# Patient Record
Sex: FEMALE | Race: WHITE | Employment: UNEMPLOYED | URBAN - NONMETROPOLITAN AREA
[De-identification: names, ages, dates, MRNs, and addresses within clinical notes are randomized per-mention and may not be internally consistent; named-entity substitution may affect disease eponyms.]

---

## 2020-12-19 ENCOUNTER — APPOINTMENT (OUTPATIENT)
Dept: GENERAL RADIOLOGY | Age: 55
End: 2020-12-19
Payer: COMMERCIAL

## 2020-12-19 ENCOUNTER — HOSPITAL ENCOUNTER (EMERGENCY)
Age: 55
Discharge: HOME | End: 2020-12-19
Attending: EMERGENCY MEDICINE
Payer: COMMERCIAL

## 2020-12-19 VITALS
DIASTOLIC BLOOD PRESSURE: 136 MMHG | WEIGHT: 165 LBS | OXYGEN SATURATION: 88 % | RESPIRATION RATE: 21 BRPM | BODY MASS INDEX: 28.17 KG/M2 | SYSTOLIC BLOOD PRESSURE: 166 MMHG | TEMPERATURE: 96.9 F | HEART RATE: 103 BPM | HEIGHT: 64 IN

## 2020-12-19 PROBLEM — R07.89 NON-CARDIAC CHEST PAIN: Status: ACTIVE | Noted: 2020-12-19

## 2020-12-19 LAB
ABSOLUTE EOS #: 0.12 K/UL (ref 0–0.44)
ABSOLUTE IMMATURE GRANULOCYTE: 0.03 K/UL (ref 0–0.3)
ABSOLUTE LYMPH #: 1.83 K/UL (ref 1.1–3.7)
ABSOLUTE MONO #: 0.46 K/UL (ref 0.1–1.2)
ANION GAP SERPL CALCULATED.3IONS-SCNC: 13 MMOL/L (ref 9–17)
BASOPHILS # BLD: 0 % (ref 0–2)
BASOPHILS ABSOLUTE: <0.03 K/UL (ref 0–0.2)
BNP INTERPRETATION: NORMAL
BUN BLDV-MCNC: 9 MG/DL (ref 6–20)
BUN/CREAT BLD: 15 (ref 9–20)
CALCIUM SERPL-MCNC: 9.9 MG/DL (ref 8.6–10.4)
CHLORIDE BLD-SCNC: 105 MMOL/L (ref 98–107)
CO2: 24 MMOL/L (ref 20–31)
CREAT SERPL-MCNC: 0.59 MG/DL (ref 0.5–0.9)
D-DIMER QUANTITATIVE: <0.27 MG/L FEU (ref 0–0.59)
DIFFERENTIAL TYPE: ABNORMAL
EOSINOPHILS RELATIVE PERCENT: 1 % (ref 1–4)
GFR AFRICAN AMERICAN: >60 ML/MIN
GFR NON-AFRICAN AMERICAN: >60 ML/MIN
GFR SERPL CREATININE-BSD FRML MDRD: ABNORMAL ML/MIN/{1.73_M2}
GFR SERPL CREATININE-BSD FRML MDRD: ABNORMAL ML/MIN/{1.73_M2}
GLUCOSE BLD-MCNC: 118 MG/DL (ref 70–99)
HCT VFR BLD CALC: 44 % (ref 36.3–47.1)
HEMOGLOBIN: 14.4 G/DL (ref 11.9–15.1)
IMMATURE GRANULOCYTES: 0 %
LYMPHOCYTES # BLD: 22 % (ref 24–43)
MCH RBC QN AUTO: 31 PG (ref 25.2–33.5)
MCHC RBC AUTO-ENTMCNC: 32.7 G/DL (ref 28.4–34.8)
MCV RBC AUTO: 94.6 FL (ref 82.6–102.9)
MONOCYTES # BLD: 6 % (ref 3–12)
NRBC AUTOMATED: 0 PER 100 WBC
PDW BLD-RTO: 13.5 % (ref 11.8–14.4)
PLATELET # BLD: 279 K/UL (ref 138–453)
PLATELET ESTIMATE: ABNORMAL
PMV BLD AUTO: 11 FL (ref 8.1–13.5)
POTASSIUM SERPL-SCNC: 3.7 MMOL/L (ref 3.7–5.3)
PRO-BNP: 115 PG/ML
RBC # BLD: 4.65 M/UL (ref 3.95–5.11)
RBC # BLD: ABNORMAL 10*6/UL
SARS-COV-2, RAPID: NOT DETECTED
SARS-COV-2: NORMAL
SARS-COV-2: NORMAL
SEG NEUTROPHILS: 71 % (ref 36–65)
SEGMENTED NEUTROPHILS ABSOLUTE COUNT: 5.85 K/UL (ref 1.5–8.1)
SODIUM BLD-SCNC: 142 MMOL/L (ref 135–144)
SOURCE: NORMAL
TROPONIN INTERP: NORMAL
TROPONIN INTERP: NORMAL
TROPONIN T: NORMAL NG/ML
TROPONIN T: NORMAL NG/ML
TROPONIN, HIGH SENSITIVITY: <6 NG/L (ref 0–14)
TROPONIN, HIGH SENSITIVITY: <6 NG/L (ref 0–14)
WBC # BLD: 8.3 K/UL (ref 3.5–11.3)
WBC # BLD: ABNORMAL 10*3/UL

## 2020-12-19 PROCEDURE — 80048 BASIC METABOLIC PNL TOTAL CA: CPT

## 2020-12-19 PROCEDURE — U0002 COVID-19 LAB TEST NON-CDC: HCPCS

## 2020-12-19 PROCEDURE — 2580000003 HC RX 258: Performed by: EMERGENCY MEDICINE

## 2020-12-19 PROCEDURE — 71045 X-RAY EXAM CHEST 1 VIEW: CPT

## 2020-12-19 PROCEDURE — 83880 ASSAY OF NATRIURETIC PEPTIDE: CPT

## 2020-12-19 PROCEDURE — 6360000002 HC RX W HCPCS: Performed by: EMERGENCY MEDICINE

## 2020-12-19 PROCEDURE — 6370000000 HC RX 637 (ALT 250 FOR IP): Performed by: EMERGENCY MEDICINE

## 2020-12-19 PROCEDURE — U0003 INFECTIOUS AGENT DETECTION BY NUCLEIC ACID (DNA OR RNA); SEVERE ACUTE RESPIRATORY SYNDROME CORONAVIRUS 2 (SARS-COV-2) (CORONAVIRUS DISEASE [COVID-19]), AMPLIFIED PROBE TECHNIQUE, MAKING USE OF HIGH THROUGHPUT TECHNOLOGIES AS DESCRIBED BY CMS-2020-01-R: HCPCS

## 2020-12-19 PROCEDURE — 36415 COLL VENOUS BLD VENIPUNCTURE: CPT

## 2020-12-19 PROCEDURE — 93005 ELECTROCARDIOGRAM TRACING: CPT | Performed by: EMERGENCY MEDICINE

## 2020-12-19 PROCEDURE — 85025 COMPLETE CBC W/AUTO DIFF WBC: CPT

## 2020-12-19 PROCEDURE — 99285 EMERGENCY DEPT VISIT HI MDM: CPT

## 2020-12-19 PROCEDURE — 96374 THER/PROPH/DIAG INJ IV PUSH: CPT

## 2020-12-19 PROCEDURE — 96375 TX/PRO/DX INJ NEW DRUG ADDON: CPT

## 2020-12-19 PROCEDURE — 85379 FIBRIN DEGRADATION QUANT: CPT

## 2020-12-19 PROCEDURE — 84484 ASSAY OF TROPONIN QUANT: CPT

## 2020-12-19 RX ORDER — ASPIRIN 81 MG/1
324 TABLET, CHEWABLE ORAL ONCE
Status: COMPLETED | OUTPATIENT
Start: 2020-12-19 | End: 2020-12-19

## 2020-12-19 RX ORDER — NITROGLYCERIN 0.4 MG/1
0.4 TABLET SUBLINGUAL ONCE
Status: COMPLETED | OUTPATIENT
Start: 2020-12-19 | End: 2020-12-19

## 2020-12-19 RX ORDER — ZALEPLON 5 MG/1
5 CAPSULE ORAL
COMMUNITY

## 2020-12-19 RX ORDER — SODIUM CHLORIDE 9 MG/ML
150 INJECTION, SOLUTION INTRAVENOUS CONTINUOUS
Status: DISCONTINUED | OUTPATIENT
Start: 2020-12-19 | End: 2020-12-19 | Stop reason: HOSPADM

## 2020-12-19 RX ORDER — ONDANSETRON 2 MG/ML
4 INJECTION INTRAMUSCULAR; INTRAVENOUS ONCE
Status: COMPLETED | OUTPATIENT
Start: 2020-12-19 | End: 2020-12-19

## 2020-12-19 RX ORDER — FENTANYL CITRATE 50 UG/ML
50 INJECTION, SOLUTION INTRAMUSCULAR; INTRAVENOUS ONCE
Status: COMPLETED | OUTPATIENT
Start: 2020-12-19 | End: 2020-12-19

## 2020-12-19 RX ADMIN — ASPIRIN 81 MG CHEWABLE TABLET 324 MG: 81 TABLET CHEWABLE at 12:00

## 2020-12-19 RX ADMIN — NITROGLYCERIN 0.4 MG: 0.4 TABLET SUBLINGUAL at 12:04

## 2020-12-19 RX ADMIN — TICAGRELOR 180 MG: 90 TABLET ORAL at 12:00

## 2020-12-19 RX ADMIN — NITROGLYCERIN 0.4 MG: 0.4 TABLET SUBLINGUAL at 12:11

## 2020-12-19 RX ADMIN — FENTANYL CITRATE 50 MCG: 50 INJECTION, SOLUTION INTRAMUSCULAR; INTRAVENOUS at 12:17

## 2020-12-19 RX ADMIN — ONDANSETRON 4 MG: 2 INJECTION INTRAMUSCULAR; INTRAVENOUS at 12:00

## 2020-12-19 RX ADMIN — SODIUM CHLORIDE 150 ML/HR: 9 INJECTION, SOLUTION INTRAVENOUS at 12:00

## 2020-12-19 ASSESSMENT — PAIN DESCRIPTION - LOCATION
LOCATION: CHEST
LOCATION: CHEST

## 2020-12-19 ASSESSMENT — PAIN DESCRIPTION - PAIN TYPE
TYPE: ACUTE PAIN
TYPE: ACUTE PAIN

## 2020-12-19 ASSESSMENT — PAIN DESCRIPTION - ORIENTATION: ORIENTATION: MID

## 2020-12-19 ASSESSMENT — ENCOUNTER SYMPTOMS
VOMITING: 0
NAUSEA: 0
SHORTNESS OF BREATH: 1
DIARRHEA: 0
ABDOMINAL DISTENTION: 0
COLOR CHANGE: 0
COUGH: 0
TROUBLE SWALLOWING: 0
BACK PAIN: 0
CHOKING: 0
WHEEZING: 0
ABDOMINAL PAIN: 0
CONSTIPATION: 0

## 2020-12-19 ASSESSMENT — PAIN SCALES - GENERAL
PAINLEVEL_OUTOF10: 5
PAINLEVEL_OUTOF10: 2

## 2020-12-19 ASSESSMENT — PAIN DESCRIPTION - DESCRIPTORS: DESCRIPTORS: PRESSURE

## 2020-12-19 NOTE — ED PROVIDER NOTES
[facility] ED  EMERGENCY DEPARTMENT ENCOUNTER      Pt Swati Rizo  MRN: [id number]  Birthdate 1965  Date of evaluation: 12/19/2020  Provider: Hosea Carey MD    [address] [place] [address]     Chief Complaint   Patient presents with    Chest Pain     Midsternal--states it feel like an elephant on her chest, intermittent onset yesterday--improved after throwing up    Shortness of Breath     onset yesterday with the chest pain         HISTORY OF PRESENT ILLNESS   (Location/Symptom, Timing/Onset, Context/Setting, Quality, Duration, Modifying Factors, Severity)  Note limiting factors. HPI the patient is a 39-year-old female who presents with a history of chest pain and nausea since yesterday. Earlier today she rated her chest pain at a level 10. After vomiting it came down to a level 5. She also got short of breath. The chest pain is a tightness and squeezing. It does not radiate into her neck or into her arms. Patient has no history of coronary artery disease. No hypertension, hyperlipidemia or diabetes. There is a family history of coronary artery disease and strokes. Nursing Notes were reviewed. REVIEW OF SYSTEMS    (2-9 systems for level 4, 10 or more for level 5)     Review of Systems   Constitutional: Positive for activity change and appetite change. Negative for fatigue and fever. HENT: Negative for congestion and trouble swallowing. Eyes: Negative for visual disturbance. Respiratory: Positive for shortness of breath. Negative for cough, choking and wheezing. Cardiovascular: Positive for chest pain. Negative for palpitations and leg swelling. Gastrointestinal: Negative for abdominal distention, abdominal pain, constipation, diarrhea, nausea and vomiting. Genitourinary: Negative for difficulty urinating and flank pain. Musculoskeletal: Negative for arthralgias, back pain, gait problem, neck pain and neck stiffness.    Skin: Negative for color change and pallor. Neurological: Negative for dizziness, light-headedness and headaches. Psychiatric/Behavioral: Negative for confusion, decreased concentration and dysphoric mood. MEDICAL HISTORY     Past Medical History:   Diagnosis Date    Allergic rhinitis     Anxiety     Arthritis     GERD (gastroesophageal reflux disease)     Impaired fasting glucose     Ligament tear     Rt shoulder [facility] injections    Pure hyperglyceridemia          SURGICAL HISTORY       Past Surgical History:   Procedure Laterality Date    APPENDECTOMY  1995    BUNIONECTOMY  9/2011, 11/2011    CARPAL TUNNEL RELEASE  1993   [address]    breech    COLONOSCOPY  12/15/2015    Dr. Santana Montana    left wrist         CURRENT MEDICATIONS       Current Discharge Medication List      CONTINUE these medications which have NOT CHANGED    Details   zaleplon (SONATA) 5 MG capsule Take 5 mg by mouth. amphetamine-dextroamphetamine (ADDERALL) 20 MG tablet Take 15 mg by mouth 2 times daily. gabapentin (NEURONTIN) 300 MG capsule Take 1 capsule by mouth 3 times daily. Qty: 90 capsule, Refills: 0      QUEtiapine (SEROQUEL) 25 MG tablet Take 325 mg by mouth nightly       loratadine (CLARITIN) 10 MG tablet Take 10 mg by mouth daily      diclofenac sodium (VOLTAREN) 1 % GEL Apply 4 g topically 4 times daily as needed for up to 30 days. Qty: 5 Tube, Refills: 0    Associated Diagnoses: Left shoulder pain; Bilateral shoulder pain; Collar bone pain, left; Chronic pain; Other chronic pain;  Other acute pain             ALLERGIES     Pcn [penicillins], Norco [hydrocodone-acetaminophen], and Sulfa antibiotics    FAMILY HISTORY       Family History   Problem Relation Age of Onset    Heart Disease Mother     High Cholesterol Mother     Stroke Mother     Breast Cancer Maternal Grandmother     High Cholesterol Father     High Blood Pressure Sister           SOCIAL HISTORY       Social History Socioeconomic History    Marital status:      Spouse name: None    Number of children: None    Years of education: None    Highest education level: None   Occupational History    None   Social Needs    Financial resource strain: None    Food insecurity     Worry: None     Inability: None    Transportation needs     Medical: None     Non-medical: None   Tobacco Use    Smoking status: Current Every Day Smoker     Packs/day: 0.25     Years: 30.00     Pack years: 7.50     Types: Cigarettes   Substance and Sexual Activity    Alcohol use: Yes     Comment: occas    Drug use: No    Sexual activity: Not Currently   Lifestyle    Physical activity     Days per week: None     Minutes per session: None    Stress: None   Relationships    Social connections     Talks on phone: None     Gets together: None     Attends Holiness service: None     Active member of club or organization: None     Attends meetings of clubs or organizations: None     Relationship status: None    Intimate partner violence     Fear of current or ex partner: None     Emotionally abused: None     Physically abused: None     Forced sexual activity: None   Other Topics Concern    None   Social History Narrative    None       SCREENINGS    [place] Coma Scale  Eye Opening: Spontaneous  Best Verbal Response: Oriented  Best Motor Response: Obeys commands  [place] Coma Scale Score: 15        PHYSICAL EXAM  (up to 7 for level 4, 8 or more for level 5)     ED Triage Vitals [12/19/20 1111]   BP Temp Temp Source Pulse Resp SpO2 Height Weight   (!) 122/52 96.9 °F (36.1 °C) Tympanic 90 18 98 % 5' 3.5\" (1.613 m) 165 lb (74.8 kg)       Physical Exam  Constitutional:       General: She is not in acute distress. Appearance: She is well-developed. She is obese. HENT:      Head: Normocephalic and atraumatic. Eyes:      Extraocular Movements: Extraocular movements intact. Pupils: Pupils are equal, round, and reactive to light.    Neck: EMERGENCY DEPARTMENT COURSE and DIFFERENTIAL DIAGNOSIS/MDM:   Vitals:    Vitals:    12/19/20 1209 12/19/20 1215 12/19/20 1216 12/19/20 1313   BP: 113/65 (!) 118/41 (!) 118/41 (!) 65/47   Pulse: 101 99  90   Resp: (!) 42 16  13   Temp:       TempSrc:       SpO2: 97% 90%  (!) 75%   Weight:       Height:               MDM the patient did not want to be admitted. Since both of her troponins were negative and the EKG was negative I feel it is reasonable to allow her to go home. I have strongly encouraged her to return to the emergency department if she starts having discomfort again. CONSULTS:  None    PROCEDURES:  Unless otherwise noted below, none     Procedures    FINAL IMPRESSION      1.  Non-cardiac chest pain          DISPOSITION/PLAN   DISPOSITION Decision To Discharge 12/19/2020 01:49:19 PM      PATIENT REFERRED TO:  Denise Mcdowell MD  [address] [place] [address] Ney Dang  [facility] [address] [phone]    Schedule an appointment as soon as possible for a visit         DISCHARGE MEDICATIONS:  Current Discharge Medication List                 (Please note that portions ofthis note were completed with a voice recognition program.  Efforts were made to edit the dictations but occasionally words are mis-transcribed.)      Dulce Bender MD (electronically signed)  Attending Emergency Physician          Munir Allen MD  12/19/20 2938

## 2020-12-20 LAB
EKG ATRIAL RATE: 94 BPM
EKG P AXIS: 79 DEGREES
EKG P-R INTERVAL: 126 MS
EKG Q-T INTERVAL: 382 MS
EKG QRS DURATION: 102 MS
EKG QTC CALCULATION (BAZETT): 477 MS
EKG R AXIS: 79 DEGREES
EKG T AXIS: 56 DEGREES
EKG VENTRICULAR RATE: 94 BPM

## 2020-12-20 PROCEDURE — 93010 ELECTROCARDIOGRAM REPORT: CPT | Performed by: INTERNAL MEDICINE

## 2020-12-21 NOTE — PROGRESS NOTES
Cardiology follow up requested by the emergency room team. Please call the patient to schedule appointment.  Thank you

## 2022-07-11 ENCOUNTER — HOSPITAL ENCOUNTER (OUTPATIENT)
Age: 57
Discharge: HOME | End: 2022-07-11
Payer: COMMERCIAL

## 2022-07-11 LAB
ANION GAP SERPL CALCULATED.3IONS-SCNC: 11 MMOL/L (ref 9–17)
BUN BLDV-MCNC: 9 MG/DL (ref 6–20)
BUN/CREAT BLD: 13 (ref 9–20)
C-REACTIVE PROTEIN: 4.1 MG/L (ref 0–5)
CALCIUM SERPL-MCNC: 10.1 MG/DL (ref 8.6–10.4)
CHLORIDE BLD-SCNC: 103 MMOL/L (ref 98–107)
CO2: 28 MMOL/L (ref 20–31)
CREAT SERPL-MCNC: 0.67 MG/DL (ref 0.5–0.9)
GFR AFRICAN AMERICAN: >60 ML/MIN
GFR NON-AFRICAN AMERICAN: >60 ML/MIN
GFR SERPL CREATININE-BSD FRML MDRD: NORMAL ML/MIN/{1.73_M2}
GFR SERPL CREATININE-BSD FRML MDRD: NORMAL ML/MIN/{1.73_M2}
GLUCOSE BLD-MCNC: 74 MG/DL (ref 70–99)
HCT VFR BLD CALC: 43.9 % (ref 36.3–47.1)
HEMOGLOBIN: 13.7 G/DL (ref 11.9–15.1)
MCH RBC QN AUTO: 30.2 PG (ref 25.2–33.5)
MCHC RBC AUTO-ENTMCNC: 31.2 G/DL (ref 28.4–34.8)
MCV RBC AUTO: 96.9 FL (ref 82.6–102.9)
NRBC AUTOMATED: 0 PER 100 WBC
PDW BLD-RTO: 14.1 % (ref 11.8–14.4)
PLATELET # BLD: 281 K/UL (ref 138–453)
PMV BLD AUTO: 12.7 FL (ref 8.1–13.5)
POTASSIUM SERPL-SCNC: 3.7 MMOL/L (ref 3.7–5.3)
RBC # BLD: 4.53 M/UL (ref 3.95–5.11)
SEDIMENTATION RATE, ERYTHROCYTE: 3 MM/HR (ref 0–30)
SODIUM BLD-SCNC: 142 MMOL/L (ref 135–144)
WBC # BLD: 6.5 K/UL (ref 3.5–11.3)

## 2022-07-11 PROCEDURE — 86140 C-REACTIVE PROTEIN: CPT

## 2022-07-11 PROCEDURE — 36415 COLL VENOUS BLD VENIPUNCTURE: CPT

## 2022-07-11 PROCEDURE — 80048 BASIC METABOLIC PNL TOTAL CA: CPT

## 2022-07-11 PROCEDURE — 93005 ELECTROCARDIOGRAM TRACING: CPT | Performed by: PODIATRIST

## 2022-07-11 PROCEDURE — 85027 COMPLETE CBC AUTOMATED: CPT

## 2022-07-11 PROCEDURE — 85652 RBC SED RATE AUTOMATED: CPT

## 2022-07-12 LAB
EKG ATRIAL RATE: 72 BPM
EKG P AXIS: 68 DEGREES
EKG P-R INTERVAL: 128 MS
EKG Q-T INTERVAL: 404 MS
EKG QRS DURATION: 96 MS
EKG QTC CALCULATION (BAZETT): 469 MS
EKG R AXIS: 67 DEGREES
EKG T AXIS: 46 DEGREES
EKG VENTRICULAR RATE: 81 BPM

## 2022-07-12 PROCEDURE — 93010 ELECTROCARDIOGRAM REPORT: CPT | Performed by: FAMILY MEDICINE

## 2022-07-25 NOTE — PROGRESS NOTES
Patient instructed on the pre-operative, intra-operative, and post-operative process. Patient's surgical procedure and day of surgery confirmed. Patient instructed on NPO status. Medication instructions reviewed with patient, patient instructed to take meds with small sip of water but patient states she will probably hold off and take them after surgery. CHG pre-operative wash instructions reviewed with patient. Pre operative instruction sheet reviewed with patient per PAT phone interview. Patient voiced understanding and denies any questions at this time.

## 2022-07-27 ENCOUNTER — ANESTHESIA EVENT (OUTPATIENT)
Dept: OPERATING ROOM | Age: 57
End: 2022-07-27
Payer: COMMERCIAL

## 2022-07-28 ENCOUNTER — ANESTHESIA (OUTPATIENT)
Dept: OPERATING ROOM | Age: 57
End: 2022-07-28
Payer: COMMERCIAL

## 2022-07-28 ENCOUNTER — APPOINTMENT (OUTPATIENT)
Dept: GENERAL RADIOLOGY | Age: 57
End: 2022-07-28
Attending: PODIATRIST
Payer: COMMERCIAL

## 2022-07-28 ENCOUNTER — HOSPITAL ENCOUNTER (OUTPATIENT)
Age: 57
Setting detail: OUTPATIENT SURGERY
Discharge: HOME | End: 2022-07-28
Attending: PODIATRIST | Admitting: PODIATRIST
Payer: COMMERCIAL

## 2022-07-28 VITALS
HEIGHT: 64 IN | SYSTOLIC BLOOD PRESSURE: 115 MMHG | DIASTOLIC BLOOD PRESSURE: 86 MMHG | OXYGEN SATURATION: 97 % | BODY MASS INDEX: 26.46 KG/M2 | RESPIRATION RATE: 16 BRPM | TEMPERATURE: 97.2 F | HEART RATE: 74 BPM | WEIGHT: 155 LBS

## 2022-07-28 PROCEDURE — 2580000003 HC RX 258: Performed by: NURSE ANESTHETIST, CERTIFIED REGISTERED

## 2022-07-28 PROCEDURE — 2720000010 HC SURG SUPPLY STERILE: Performed by: PODIATRIST

## 2022-07-28 PROCEDURE — 6370000000 HC RX 637 (ALT 250 FOR IP): Performed by: NURSE ANESTHETIST, CERTIFIED REGISTERED

## 2022-07-28 PROCEDURE — C1713 ANCHOR/SCREW BN/BN,TIS/BN: HCPCS | Performed by: PODIATRIST

## 2022-07-28 PROCEDURE — C1776 JOINT DEVICE (IMPLANTABLE): HCPCS | Performed by: PODIATRIST

## 2022-07-28 PROCEDURE — 2500000003 HC RX 250 WO HCPCS: Performed by: NURSE ANESTHETIST, CERTIFIED REGISTERED

## 2022-07-28 PROCEDURE — 3600000003 HC SURGERY LEVEL 3 BASE: Performed by: PODIATRIST

## 2022-07-28 PROCEDURE — 6360000002 HC RX W HCPCS: Performed by: NURSE ANESTHETIST, CERTIFIED REGISTERED

## 2022-07-28 PROCEDURE — 2500000003 HC RX 250 WO HCPCS: Performed by: PODIATRIST

## 2022-07-28 PROCEDURE — 3600000013 HC SURGERY LEVEL 3 ADDTL 15MIN: Performed by: PODIATRIST

## 2022-07-28 PROCEDURE — 7100000011 HC PHASE II RECOVERY - ADDTL 15 MIN: Performed by: PODIATRIST

## 2022-07-28 PROCEDURE — 3700000001 HC ADD 15 MINUTES (ANESTHESIA): Performed by: PODIATRIST

## 2022-07-28 PROCEDURE — 3209999900 FLUORO FOR SURGICAL PROCEDURES

## 2022-07-28 PROCEDURE — 3700000000 HC ANESTHESIA ATTENDED CARE: Performed by: PODIATRIST

## 2022-07-28 PROCEDURE — 2709999900 HC NON-CHARGEABLE SUPPLY: Performed by: PODIATRIST

## 2022-07-28 PROCEDURE — 7100000010 HC PHASE II RECOVERY - FIRST 15 MIN: Performed by: PODIATRIST

## 2022-07-28 DEVICE — SYSTEM IMPL FOREFOOT PEEK W/ 3X8MM TENODESIS SCR OBLONG: Type: IMPLANTABLE DEVICE | Site: FOOT | Status: FUNCTIONAL

## 2022-07-28 DEVICE — GRAFT BONE SUBSTITUTE FIBERGRAFT 1CC: Type: IMPLANTABLE DEVICE | Site: FOOT | Status: FUNCTIONAL

## 2022-07-28 DEVICE — SCREW BNE L18MM DIA2.7MM ANK S STL ST VAR ANG LOK FULL THRD: Type: IMPLANTABLE DEVICE | Site: FOOT | Status: FUNCTIONAL

## 2022-07-28 DEVICE — SCREW BNE L14MM DIA2.7MM ANK S STL ST VAR ANG LOK FULL THRD: Type: IMPLANTABLE DEVICE | Site: FOOT | Status: FUNCTIONAL

## 2022-07-28 DEVICE — SCREW BNE L16MM DIA2.7MM ANK S STL ST VAR ANG LOK FULL THRD: Type: IMPLANTABLE DEVICE | Site: FOOT | Status: FUNCTIONAL

## 2022-07-28 DEVICE — IMPLANTABLE DEVICE: Type: IMPLANTABLE DEVICE | Site: FOOT | Status: FUNCTIONAL

## 2022-07-28 RX ORDER — DIMENHYDRINATE 50 MG/1
50 TABLET ORAL ONCE
Status: COMPLETED | OUTPATIENT
Start: 2022-07-28 | End: 2022-07-28

## 2022-07-28 RX ORDER — FENTANYL CITRATE 50 UG/ML
INJECTION, SOLUTION INTRAMUSCULAR; INTRAVENOUS PRN
Status: DISCONTINUED | OUTPATIENT
Start: 2022-07-28 | End: 2022-07-28 | Stop reason: SDUPTHER

## 2022-07-28 RX ORDER — DEXAMETHASONE SODIUM PHOSPHATE 4 MG/ML
INJECTION, SOLUTION INTRA-ARTICULAR; INTRALESIONAL; INTRAMUSCULAR; INTRAVENOUS; SOFT TISSUE PRN
Status: DISCONTINUED | OUTPATIENT
Start: 2022-07-28 | End: 2022-07-28 | Stop reason: SDUPTHER

## 2022-07-28 RX ORDER — PHENYLEPHRINE HYDROCHLORIDE 10 MG/ML
INJECTION INTRAVENOUS PRN
Status: DISCONTINUED | OUTPATIENT
Start: 2022-07-28 | End: 2022-07-28 | Stop reason: SDUPTHER

## 2022-07-28 RX ORDER — SODIUM CHLORIDE 0.9 % (FLUSH) 0.9 %
5-40 SYRINGE (ML) INJECTION EVERY 12 HOURS SCHEDULED
Status: DISCONTINUED | OUTPATIENT
Start: 2022-07-28 | End: 2022-07-28 | Stop reason: HOSPADM

## 2022-07-28 RX ORDER — BUPIVACAINE HYDROCHLORIDE 5 MG/ML
INJECTION, SOLUTION EPIDURAL; INTRACAUDAL PRN
Status: DISCONTINUED | OUTPATIENT
Start: 2022-07-28 | End: 2022-07-28 | Stop reason: ALTCHOICE

## 2022-07-28 RX ORDER — PROPOFOL 10 MG/ML
INJECTION, EMULSION INTRAVENOUS PRN
Status: DISCONTINUED | OUTPATIENT
Start: 2022-07-28 | End: 2022-07-28 | Stop reason: SDUPTHER

## 2022-07-28 RX ORDER — SODIUM CHLORIDE, SODIUM LACTATE, POTASSIUM CHLORIDE, CALCIUM CHLORIDE 600; 310; 30; 20 MG/100ML; MG/100ML; MG/100ML; MG/100ML
INJECTION, SOLUTION INTRAVENOUS CONTINUOUS
Status: DISCONTINUED | OUTPATIENT
Start: 2022-07-28 | End: 2022-07-28 | Stop reason: HOSPADM

## 2022-07-28 RX ORDER — CLINDAMYCIN PHOSPHATE 900 MG/50ML
900 INJECTION INTRAVENOUS ONCE
Status: COMPLETED | OUTPATIENT
Start: 2022-07-28 | End: 2022-07-28

## 2022-07-28 RX ORDER — ACETAMINOPHEN 325 MG/1
650 TABLET ORAL ONCE
Status: COMPLETED | OUTPATIENT
Start: 2022-07-28 | End: 2022-07-28

## 2022-07-28 RX ORDER — SODIUM CHLORIDE 0.9 % (FLUSH) 0.9 %
5-40 SYRINGE (ML) INJECTION PRN
Status: DISCONTINUED | OUTPATIENT
Start: 2022-07-28 | End: 2022-07-28 | Stop reason: HOSPADM

## 2022-07-28 RX ORDER — KETOROLAC TROMETHAMINE 15 MG/ML
30 INJECTION, SOLUTION INTRAMUSCULAR; INTRAVENOUS
Status: COMPLETED | OUTPATIENT
Start: 2022-07-28 | End: 2022-07-28

## 2022-07-28 RX ORDER — ONDANSETRON 2 MG/ML
INJECTION INTRAMUSCULAR; INTRAVENOUS PRN
Status: DISCONTINUED | OUTPATIENT
Start: 2022-07-28 | End: 2022-07-28 | Stop reason: SDUPTHER

## 2022-07-28 RX ORDER — SODIUM CHLORIDE 9 MG/ML
INJECTION, SOLUTION INTRAVENOUS PRN
Status: DISCONTINUED | OUTPATIENT
Start: 2022-07-28 | End: 2022-07-28 | Stop reason: HOSPADM

## 2022-07-28 RX ORDER — ONDANSETRON 2 MG/ML
4 INJECTION INTRAMUSCULAR; INTRAVENOUS
Status: DISCONTINUED | OUTPATIENT
Start: 2022-07-28 | End: 2022-07-28 | Stop reason: HOSPADM

## 2022-07-28 RX ORDER — LIDOCAINE HYDROCHLORIDE 20 MG/ML
INJECTION, SOLUTION EPIDURAL; INFILTRATION; INTRACAUDAL; PERINEURAL PRN
Status: DISCONTINUED | OUTPATIENT
Start: 2022-07-28 | End: 2022-07-28 | Stop reason: SDUPTHER

## 2022-07-28 RX ADMIN — CLINDAMYCIN PHOSPHATE 900 MG: 900 INJECTION, SOLUTION INTRAVENOUS at 10:24

## 2022-07-28 RX ADMIN — LIDOCAINE HYDROCHLORIDE 100 MG: 20 INJECTION, SOLUTION EPIDURAL; INFILTRATION; INTRACAUDAL; PERINEURAL at 10:31

## 2022-07-28 RX ADMIN — KETOROLAC TROMETHAMINE 30 MG: 15 INJECTION, SOLUTION INTRAMUSCULAR; INTRAVENOUS at 12:41

## 2022-07-28 RX ADMIN — ONDANSETRON 4 MG: 2 INJECTION INTRAMUSCULAR; INTRAVENOUS at 10:30

## 2022-07-28 RX ADMIN — PHENYLEPHRINE HYDROCHLORIDE 100 MCG: 10 INJECTION INTRAVENOUS at 10:44

## 2022-07-28 RX ADMIN — DIMENHYDRINATE 50 MG: 50 TABLET ORAL at 08:58

## 2022-07-28 RX ADMIN — PROPOFOL 150 MG: 10 INJECTION, EMULSION INTRAVENOUS at 10:32

## 2022-07-28 RX ADMIN — SODIUM CHLORIDE, POTASSIUM CHLORIDE, SODIUM LACTATE AND CALCIUM CHLORIDE: 600; 310; 30; 20 INJECTION, SOLUTION INTRAVENOUS at 09:11

## 2022-07-28 RX ADMIN — PHENYLEPHRINE HYDROCHLORIDE 100 MCG: 10 INJECTION INTRAVENOUS at 10:40

## 2022-07-28 RX ADMIN — DEXAMETHASONE SODIUM PHOSPHATE 4 MG: 4 INJECTION, SOLUTION INTRAMUSCULAR; INTRAVENOUS at 10:35

## 2022-07-28 RX ADMIN — PHENYLEPHRINE HYDROCHLORIDE 100 MCG: 10 INJECTION INTRAVENOUS at 11:16

## 2022-07-28 RX ADMIN — PHENYLEPHRINE HYDROCHLORIDE 100 MCG: 10 INJECTION INTRAVENOUS at 10:42

## 2022-07-28 RX ADMIN — FENTANYL CITRATE 25 MCG: 50 INJECTION INTRAMUSCULAR; INTRAVENOUS at 10:49

## 2022-07-28 RX ADMIN — PHENYLEPHRINE HYDROCHLORIDE 100 MCG: 10 INJECTION INTRAVENOUS at 11:10

## 2022-07-28 RX ADMIN — SODIUM CHLORIDE, POTASSIUM CHLORIDE, SODIUM LACTATE AND CALCIUM CHLORIDE: 600; 310; 30; 20 INJECTION, SOLUTION INTRAVENOUS at 12:04

## 2022-07-28 RX ADMIN — FENTANYL CITRATE 25 MCG: 50 INJECTION INTRAMUSCULAR; INTRAVENOUS at 12:15

## 2022-07-28 RX ADMIN — ACETAMINOPHEN 650 MG: 325 TABLET ORAL at 08:58

## 2022-07-28 ASSESSMENT — PAIN DESCRIPTION - PAIN TYPE: TYPE: ACUTE PAIN;SURGICAL PAIN

## 2022-07-28 ASSESSMENT — PAIN SCALES - GENERAL
PAINLEVEL_OUTOF10: 6
PAINLEVEL_OUTOF10: 2
PAINLEVEL_OUTOF10: 2
PAINLEVEL_OUTOF10: 0
PAINLEVEL_OUTOF10: 6
PAINLEVEL_OUTOF10: 6
PAINLEVEL_OUTOF10: 0

## 2022-07-28 ASSESSMENT — PAIN DESCRIPTION - DESCRIPTORS: DESCRIPTORS: ACHING

## 2022-07-28 ASSESSMENT — PAIN DESCRIPTION - LOCATION: LOCATION: FOOT

## 2022-07-28 ASSESSMENT — PAIN DESCRIPTION - ORIENTATION: ORIENTATION: LEFT

## 2022-07-28 NOTE — DISCHARGE INSTRUCTIONS
1.  Keep the surgical dressing clean, dry, and intact  2. Elevate the operative extremity above heart level when seated or lying  3. Place ice behind the knee of the operative extremity 20 minutes per hour while awake  4. Must be in cam boot at all times, including while sleeping. Weight bearing in [address]    1. Do not drive or operate hazardous machinery for 24 hours. 2.  Do not make important personal or business decisions for 24 hours. 3.  Do not drink alcoholic beverages for 24 hours. 4.  Do not smoke tobacco products for 24 hours. 5.  Eat light foods (Jell-O, soups, etc....) and drink plenty of fluids (water, Sprite, etc...) up to 8 glasses per day, as you can tolerate. 6.  If your bandages become soaked with bright red blood, place another dressing pad over your bandages. (DO NOT remove original bandage.)  Call your surgeon for further instructions. A small amount of bright red blood is to be expected. 7.  Limit your activities for 24 hours. Do not engage in heavy work until your surgeon gives you permission. 8.  Report the following signs or any questions regarding your physical condition to your surgeon immediately:    Excessive swelling of, or around the wound area. Redness. Temperature of 100 degrees (F) or above. Excessive pain. 9.  Call your surgeon for any questions regarding your surgery. 10. see your surgeon in _8/4 @ 1:00__________.      **may take motrin/ibuprofen after 7:00 pm if needed**

## 2022-07-28 NOTE — OP NOTE
Operative Note      Patient: Bhavna Archer  YOB: 1965  MRN: [id number]    Date of Procedure: 7/28/2022    Pre-Op Diagnosis: METATARSALGIS LEFT FOOT    Post-Op Diagnosis: Same       Procedure(s):  FOOT ARTHRODESIS-1ST MPJ  FOOT OSTEOTOMY-WEIL OSTEOTOMY, 2ND METATARSAL, PLANTAR PLATE REPAIR 2ND MPJ    Surgeon(s):  Lashell Shaver DPM    Assistant:   * No surgical staff found *    Anesthesia: General    Estimated Blood Loss (mL): Minimal    Complications: None    Specimens:   None  Implants:  Implant Name Type Inv. Item Serial No.  Lot No. LRB No. Used Action   GRAFT BONE SUBSTITUTE FIBERGRAFT 1CC - [id number]  GRAFT BONE SUBSTITUTE FIBERGRAFT 1CC  [facility] [id number] Left 1 Implanted   PLATE BNE M [id number] 0DEG 6 H NONSTERILE L 1ST MTP FUS S STL - [id number]  PLATE BNE M [id number] 0DEG 6 H NONSTERILE L 1ST MTP FUS S STL  DEPU[employer]  Left 1 Implanted   SCREW BNE L14MM DIA2.7MM ANK S STL ST ANDREIA ANG ALICIA FULL THRD - [id number]  SCREW BNE L14MM DIA2.7MM ANK S STL ST ANDREIA ANG ALICIA FULL THRD  DEPUY [employer]  Left 1 Implanted   SCREW BNE L16MM DIA2.7MM ANK S STL ST ANDREIA ANG ALICIA FULL THRD - [id number]  SCREW BNE L16MM DIA2.7MM ANK S STL ST ANDREIA ANG ALICIA FULL THRD  DEPU[employer]  Left 3 Implanted   SCREW BNE L18MM DIA2.7MM ANK S STL ST ANDREIA ANG ALICIA FULL THRD - [id number]  SCREW BNE L18MM DIA2.7MM ANK S STL ST ANDREIA ANG ALICIA FULL THRD  DEPUY SYN[employer]ES [employer]  Left 2 Implanted   SYSTEM IMPL FOREFOOT PEEK W/ 3X8MM TENODESIS SCR OBLONG - [id number]  SYSTEM IMPL FOREFOOT PEEK W/ 3X8MM TENODESIS SCR OBLONG  [employer] [facility]- [id number] Left 1 Implanted   2.0 quick insertion screw 14mm      Left 1 Implanted         Drains: None    Findings: See the narrative    Detailed Description of Procedure: The patient has signs and symptoms, both clinically and radiographically, that are consistent with the preprocedure diagnosis. It was determined the patient would benefit from surgical intervention.   All potential risks, benefits, and complications of surgery were discussed with the patient prior the procedure. The patient wished to proceed with surgery. Informed written consent was obtained. The patient was brought from the preoperative area placed in operating table in supine position. Following induction of anesthesia, the operative lower extremity was scrubbed, prepped, and draped in usual sterile fashion. The following procedure was then performed:  Procedure #1: First MPJ arthrodesis, left foot: Attention was directed to the dorsal aspect of the first MPJ where an incision was made. Incision was deepened through subcu slayer down to level of the capsular periosteal tissues. Capsular periosteal tissues were divided allowing for exposure of the bone. At this time a guidepin for the reamers was placed. The cup and cone reamers were then used to ream the cartilage of the first metatarsal head and the base of the proximal phalanx to the level bleeding subchondral bone. Subchondral drilling was then carried out with a 0.062 inch K wire. A 0.062 inch K wire was then used to temporarily fixate the hallux in the corrected position. Alignment was confirmed utilizing intraoperative C-arm fluoroscopy. At this time, the Synthes MPJ arthrodesis plate was applied to the dorsal aspect of the arthrodesis site and temporarily fixated utilizing all of wires. The compression forceps were then used to achieve compression across the arthrodesis site. The plate was fixated to the underlying bone utilizing a series of 2.7 mm locking screws. Alignment of the hardware and correction of the deformity was then confirmed utilizing intraoperative C-arm fluoroscopy. The wound was copiously lavaged with normal sterile saline. The deep tissues were reapproximated utilizing 3-0 Vicryl. Subcu tissues reapproach made utilizing 4-0 Vicryl. Skin was reapproximated utilizing 4-0 Monocryl suture in a running intradermal fashion.   Attention was then directed to procedure #2. Procedure #2: Weil osteotomy, second metatarsal, left foot: Attention was directed to the dorsal aspect of the MPJ. A linear periosteal incision was made over the head and neck of the metatarsal.  A McGlamry elevator was used to release the volar plate. At this time, a sagittal saw was used to create an oblique osteotomy from dorsal distal to plantar proximal across the head and neck of the metatarsal.  A parallel cut was made and a 2 mm wedge of bone was removed. The now freed head of the metatarsal was translated approximately 4 mm proximal to correct the length discrepancy. The bone was temporarily fixated with a 0.045 inch K wire. Alignment was confirmed utilizing intraoperative C-arm fluoroscopy. Fixation of the osteotomy was then achieved utilizing a 2.0 x 12 mm FRS snap off screw. The dorsal bone shelf was resected utilizing a bone rongeur. Attention was then directed to procedure #3. Procedure #3: Plantar plate repair, second MPJ, left foot: Attention was directed to the metatarsophalangeal joint, where the plantar plate was noted to be partially ruptured, as readily visible through the surgical incision. Decision was made to go forward with primary repair. At this time a drill hole was created in the neck of the metatarsal.  Labral tape with an Endobutton was then routed through the drill hole and along the plantar plate. A second drill hole was created at the base of the proximal phalanx. The labral tape was then routed from plantar to dorsal through the base of the proximal phalanx. The digit was reduced and placed in approximately 20 degrees of plantar flexion. The labral tape was then secured along the dorsal aspect of the proximal phalanx with a peek interference screw. Alignment was confirmed utilizing intraoperative C-arm fluoroscopy. The wounds were dressed with Mastisol, Steri-Strips, Betadine soaked Adaptic, gauze, Kerlix, and Coban.   The patient tolerated the procedure and anesthesia well was transferred from the operating room to PACU with vital signs stable and vascular status intact to the operative lower extremity. Following a period of postoperative monitoring, the patient will be discharged with appropriate wound care, ambulatory status, and follow-up instructions.   Sponge and needle count: Correct at the end of the procedure    Electronically signed by Magdy Martin DPM on 7/28/2022 at 12:34 PM

## 2022-07-28 NOTE — ANESTHESIA POSTPROCEDURE EVALUATION
Department of Anesthesiology  Postprocedure Note    Patient: Marielena Headley  MRN: [id number]  YOB: 1965  Date of evaluation: 7/28/2022      Procedure Summary     Date: 07/28/22 Room / Location: [address]    Anesthesia Start: 1026 Anesthesia Stop: 8952    Procedures:       FOOT ARTHRODESIS-1ST MPJ (Left: Foot)      FOOT OSTEOTOMY-WEIL OSTEOTOMY, 2ND METATARSAL, PLANTAR PLATE REPAIR 2ND MPJ (Left: Foot) Diagnosis:       Metatarsalgia of left foot      (METATARSALGIS LEFT FOOT)    Surgeons: Chau Duncan DPM Responsible Provider: Chloé Cruz    Anesthesia Type: general ASA Status: 3          Anesthesia Type: No value filed.     Laura Phase I:      Laura Phase II: Laura Score: 10      Anesthesia Post Evaluation    Patient location during evaluation: PACU  Patient participation: complete - patient participated  Level of consciousness: awake and alert  Pain score: 4  Airway patency: patent  Nausea & Vomiting: no vomiting and no nausea  Complications: no  Cardiovascular status: blood pressure returned to baseline  Respiratory status: acceptable  Hydration status: stable

## 2022-07-28 NOTE — BRIEF OP NOTE
Brief Postoperative Note      Patient: Juan Edwards  YOB: 1965  MRN: [id number]    Date of Procedure: 7/28/2022    Pre-Op Diagnosis: METATARSALGIS LEFT FOOT    Post-Op Diagnosis: Same       Procedure(s):  FOOT ARTHRODESIS-1ST MPJ  FOOT OSTEOTOMY-WEIL OSTEOTOMY, 2ND METATARSAL, PLANTAR PLATE REPAIR 2ND MPJ    Surgeon(s):  Sidney Junior DPM    Assistant:  Clemente France. Anesthesia: General    Estimated Blood Loss (mL): Minimal    Complications: None    Specimens:   None    Implants:  Implant Name Type Inv.  Item Serial No.  Lot No. LRB No. Used Action   GRAFT BONE SUBSTITUTE FIBERGRAFT 1CC - [id number]  GRAFT BONE SUBSTITUTE FIBERGRAFT 1CC  [facility]- [id number] Left 1 Implanted   PLATE BNE M [id number] 0DEG 6 H NONSTERILE L 1ST MTP FUS S STL - [id number]  PLATE BNE M [id number] 0DEG 6 H NONSTERILE L 1ST MTP FUS S STL  DE[employer]  Left 1 Implanted   SCREW BNE L14MM DIA2.7MM ANK S STL ST ANDREIA ANG ALICIA FULL THRD - [id number]  SCREW BNE L14MM DIA2.7MM ANK S STL ST ANDREIA ANG ALICIA FULL THRD  DE[employer]  Left 1 Implanted   SCREW BNE L16MM DIA2.7MM ANK S STL ST ANDREIA ANG ALICIA FULL THRD - [id number]  SCREW BNE L16MM DIA2.7MM ANK S STL ST ANDREIA ANG ALICIA FULL THRD  DEPU[employer]  Left 3 Implanted   SCREW BNE L18MM DIA2.7MM ANK S STL ST ANDREIA ANG ALCIIA FULL THRD - [id number]  SCREW BNE L18MM DIA2.7MM ANK S STL ST ANDREIA ANG ALICIA FULL THRD  DEPU[employer]  Left 2 Implanted   SYSTEM IMPL FOREFOOT PEEK W/ 3X8MM TENODESIS SCR OBLONG - [id number]  SYSTEM IMPL FOREFOOT PEEK W/ 3X8MM TENODESIS SCR OBLONG  [employer] [facility] [id number] Left 1 Implanted   2.0 quick insertion screw 14mm      Left 1 Implanted         Drains: None    Findings: See the narrative    Electronically signed by Sidney Junior DPM on 7/28/2022 at 12:33 PM

## 2022-07-28 NOTE — FLOWSHEET NOTE

## 2022-07-28 NOTE — ANESTHESIA PRE PROCEDURE
Department of Anesthesiology  Preprocedure Note       Name:  Poli Giang   Age:  59 y.o.  :  1965                                          MRN:  [id number]         Date:  2022      Surgeon: Jessie Mohs):  Ameya Chambers DPM    Procedure: Procedure(s):  FOOT ARTHRODESIS-1ST MPJ  FOOT OSTEOTOMY-WEIL OSTEOTOMY, 2ND METATARSAL, PLANTAR PLATE REPAIR 2ND MPJ    Medications prior to admission:   Prior to Admission medications    Medication Sig Start Date End Date Taking? Authorizing Provider   zaleplon (SONATA) 5 MG capsule Take 5 mg by mouth. Patient not taking: No sig reported    Historical Provider, MD   amphetamine-dextroamphetamine (ADDERALL) 20 MG tablet Take 15 mg by mouth 2 times daily. Historical Provider, MD   loratadine (CLARITIN) 10 MG tablet Take 10 mg by mouth daily  Patient not taking: No sig reported    Historical Provider, MD   gabapentin (NEURONTIN) 300 MG capsule Take 1 capsule by mouth 3 times daily. 1/29/15   Essie Lebron MD   QUEtiapine (SEROQUEL) 25 MG tablet Take 325 mg by mouth nightly     Historical Provider, MD       Current medications:    Current Facility-Administered Medications   Medication Dose Route Frequency Provider Last Rate Last Admin    lactated ringers infusion   IntraVENous Continuous Tami Mediate, APRN - CRNA 100 mL/hr at 22 0911 New Bag at 22 0911    clindamycin (CLEOCIN) 900 mg in dextrose 5 % 50 mL IVPB  900 mg IntraVENous Once Ameya Chambers DPM           Allergies:     Allergies   Allergen Reactions    Pcn [Penicillins] Shortness Of Breath    Norco [Hydrocodone-Acetaminophen] Itching    Sulfa Antibiotics Hives       Problem List:    Patient Active Problem List   Diagnosis Code    Bilateral shoulder pain M25.511, M25.512    Collar bone pain M89.8X1    Other chronic pain G89.29    Other acute pain R52    Pure hyperglyceridemia E78.1    Impaired fasting glucose R73.01    Arthritis M19.90    GERD (gastroesophageal reflux disease) K21.9    Non-cardiac chest pain R07.89       Past Medical History:        Diagnosis Date    Allergic rhinitis     Anxiety     Arthritis     GERD (gastroesophageal reflux disease)     Impaired fasting glucose     Ligament tear     R[facility] injections    Pure hyperglyceridemia        Past Surgical History:        Procedure Laterality Date    APPENDECTOMY  1995    BUNIONECTOMY  2011, 2011    CARPAL TUNNEL RELEASE Bilateral 1993     SECTION  1985    breech    COLONOSCOPY  12/15/2015    Dr. Christy Woo  1989    left wrist       Social History:    Social History     Tobacco Use    Smoking status: Former     Packs/day: 0.25     Years: 30.00     Pack years: 7.50     Types: Cigarettes     Quit date:      Years since quittin.5    Smokeless tobacco: Not on file   Substance Use Topics    Alcohol use: Not Currently                                Counseling given: Not Answered      Vital Signs (Current):   Vitals:    22 1556 22 0845   BP:  104/71   Pulse:  88   Resp:  18   Temp:  36.1 °C (97 °F)   TempSrc:  Temporal   SpO2:  99%   Weight: 155 lb (70.3 kg)    Height: 5' 4\" (1.626 m)                                               BP Readings from Last 3 Encounters:   22 104/71   20 (!) 166/136   12/15/15 94/71       NPO Status: Time of last liquid consumption:                         Time of last solid consumption: 800                        Date of last liquid consumption: 22                        Date of last solid food consumption: 22    BMI:   Wt Readings from Last 3 Encounters:   22 155 lb (70.3 kg)   20 165 lb (74.8 kg)   12/15/15 150 lb (68 kg)     Body mass index is 26.61 kg/m².     CBC:   Lab Results   Component Value Date/Time    WBC 6.5 2022 03:18 PM    RBC 4.53 2022 03:18 PM    RBC 3.88 2012 05:14 AM    HGB 13.7 2022 03:18 PM    HCT 43.9 2022 03:18 PM    MCV 96.9 07/11/2022 03:18 PM    RDW 14.1 07/11/2022 03:18 PM     07/11/2022 03:18 PM     05/04/2012 05:14 AM       CMP:   Lab Results   Component Value Date/Time     07/11/2022 03:18 PM    K 3.7 07/11/2022 03:18 PM     07/11/2022 03:18 PM    CO2 28 07/11/2022 03:18 PM    BUN 9 07/11/2022 03:18 PM    CREATININE 0.67 07/11/2022 03:18 PM    GFRAA >60 07/11/2022 03:18 PM    LABGLOM >60 07/11/2022 03:18 PM    GLUCOSE 74 07/11/2022 03:18 PM    GLUCOSE 77 05/04/2012 05:14 AM    PROT 7.4 12/07/2015 01:35 PM    CALCIUM 10.1 07/11/2022 03:18 PM    BILITOT 0.39 12/07/2015 01:35 PM    ALKPHOS 87 12/07/2015 01:35 PM    AST 11 12/07/2015 01:35 PM    ALT 7 12/07/2015 01:35 PM       POC Tests: No results for input(s): POCGLU, POCNA, POCK, POCCL, POCBUN, POCHEMO, POCHCT in the last 72 hours. Coags: No results found for: PROTIME, INR, APTT    HCG (If Applicable): No results found for: PREGTESTUR, PREGSERUM, HCG, HCGQUANT     ABGs: No results found for: PHART, PO2ART, EKJ0MAH, AVH3TAG, BEART, Y9SACROC     Type & Screen (If Applicable):  No results found for: LABABO, LABRH    Drug/Infectious Status (If Applicable):  No results found for: HIV, HEPCAB    COVID-19 Screening (If Applicable):   Lab Results   Component Value Date/Time    COVID19 Not Detected 12/19/2020 11:56 AM           Anesthesia Evaluation  Patient summary reviewed and Nursing notes reviewed  Airway: Mallampati: II  TM distance: >3 FB   Neck ROM: full  Mouth opening: > = 3 FB   Dental:    (+) lower dentures and upper dentures      Pulmonary:Negative Pulmonary ROS and normal exam                               Cardiovascular:Negative CV ROS  Exercise tolerance: good (>4 METS),                     Neuro/Psych:   Negative Neuro/Psych ROS              GI/Hepatic/Renal:   (+) GERD: well controlled,           Endo/Other: Negative Endo/Other ROS                    Abdominal:             Vascular: negative vascular ROS.          Other Findings:           Anesthesia Plan      general     ASA 3       Induction: intravenous. Anesthetic plan and risks discussed with patient.                         MAURICE Odonnell - TAINA   7/28/2022

## 2023-01-20 ENCOUNTER — HOSPITAL ENCOUNTER (EMERGENCY)
Age: 58
Discharge: HOME | End: 2023-01-21
Payer: COMMERCIAL

## 2023-01-20 VITALS
SYSTOLIC BLOOD PRESSURE: 102 MMHG | HEART RATE: 96 BPM | DIASTOLIC BLOOD PRESSURE: 59 MMHG | OXYGEN SATURATION: 97 % | RESPIRATION RATE: 16 BRPM | TEMPERATURE: 98 F

## 2023-01-20 DIAGNOSIS — Z86.59 HISTORY OF BIPOLAR DISORDER: Primary | ICD-10-CM

## 2023-01-20 LAB
ABSOLUTE EOS #: 0.17 K/UL (ref 0–0.44)
ABSOLUTE IMMATURE GRANULOCYTE: <0.03 K/UL (ref 0–0.3)
ABSOLUTE LYMPH #: 3.3 K/UL (ref 1.1–3.7)
ABSOLUTE MONO #: 0.49 K/UL (ref 0.1–1.2)
ALBUMIN SERPL-MCNC: 4.2 G/DL (ref 3.5–5.2)
ALBUMIN/GLOBULIN RATIO: 1.6 (ref 1–2.5)
ALP BLD-CCNC: 96 U/L (ref 35–104)
ALT SERPL-CCNC: 14 U/L (ref 5–33)
AMPHETAMINE SCREEN URINE: POSITIVE
ANION GAP SERPL CALCULATED.3IONS-SCNC: 11 MMOL/L (ref 9–17)
AST SERPL-CCNC: 15 U/L
BARBITURATE SCREEN URINE: NEGATIVE
BASOPHILS # BLD: 0 % (ref 0–2)
BASOPHILS ABSOLUTE: 0.03 K/UL (ref 0–0.2)
BENZODIAZEPINE SCREEN, URINE: NEGATIVE
BILIRUB SERPL-MCNC: 0.4 MG/DL (ref 0.3–1.2)
BILIRUBIN URINE: NEGATIVE
BUN BLDV-MCNC: 5 MG/DL (ref 6–20)
BUN/CREAT BLD: 8 (ref 9–20)
BUPRENORPHINE URINE: NEGATIVE
CALCIUM SERPL-MCNC: 10 MG/DL (ref 8.6–10.4)
CANNABINOID SCREEN URINE: NEGATIVE
CHLORIDE BLD-SCNC: 103 MMOL/L (ref 98–107)
CO2: 24 MMOL/L (ref 20–31)
COCAINE METABOLITE, URINE: NEGATIVE
COLOR: YELLOW
CREAT SERPL-MCNC: 0.64 MG/DL (ref 0.5–0.9)
EOSINOPHILS RELATIVE PERCENT: 2 % (ref 1–4)
ETHANOL PERCENT: <0.01 %
ETHANOL: <10 MG/DL
FENTANYL URINE: NEGATIVE
GFR SERPL CREATININE-BSD FRML MDRD: >60 ML/MIN/1.73M2
GLUCOSE BLD-MCNC: 102 MG/DL (ref 70–99)
GLUCOSE URINE: NEGATIVE
HCT VFR BLD CALC: 43.8 % (ref 36.3–47.1)
HEMOGLOBIN: 15.1 G/DL (ref 11.9–15.1)
IMMATURE GRANULOCYTES: 0 %
KETONES, URINE: NEGATIVE
LEUKOCYTE ESTERASE, URINE: NEGATIVE
LYMPHOCYTES # BLD: 40 % (ref 24–43)
MCH RBC QN AUTO: 32.3 PG (ref 25.2–33.5)
MCHC RBC AUTO-ENTMCNC: 34.5 G/DL (ref 28.4–34.8)
MCV RBC AUTO: 93.8 FL (ref 82.6–102.9)
METHADONE SCREEN, URINE: NEGATIVE
MONOCYTES # BLD: 6 % (ref 3–12)
NITRITE, URINE: NEGATIVE
NRBC AUTOMATED: 0 PER 100 WBC
OPIATES, URINE: NEGATIVE
OXYCODONE SCREEN URINE: NEGATIVE
PDW BLD-RTO: 13.7 % (ref 11.8–14.4)
PH UA: 7 (ref 5–9)
PHENCYCLIDINE, URINE: NEGATIVE
PLATELET # BLD: 347 K/UL (ref 138–453)
PMV BLD AUTO: 11.1 FL (ref 8.1–13.5)
POTASSIUM SERPL-SCNC: 3.6 MMOL/L (ref 3.7–5.3)
PROTEIN UA: NEGATIVE
RBC # BLD: 4.67 M/UL (ref 3.95–5.11)
SEG NEUTROPHILS: 52 % (ref 36–65)
SEGMENTED NEUTROPHILS ABSOLUTE COUNT: 4.2 K/UL (ref 1.5–8.1)
SODIUM BLD-SCNC: 138 MMOL/L (ref 135–144)
SPECIFIC GRAVITY UA: 1.01 (ref 1.01–1.02)
TOTAL PROTEIN: 6.9 G/DL (ref 6.4–8.3)
TURBIDITY: CLEAR
URINE HGB: NEGATIVE
UROBILINOGEN, URINE: NORMAL
WBC # BLD: 8.2 K/UL (ref 3.5–11.3)

## 2023-01-20 PROCEDURE — 85025 COMPLETE CBC W/AUTO DIFF WBC: CPT

## 2023-01-20 PROCEDURE — 99285 EMERGENCY DEPT VISIT HI MDM: CPT

## 2023-01-20 PROCEDURE — G0480 DRUG TEST DEF 1-7 CLASSES: HCPCS

## 2023-01-20 PROCEDURE — 80307 DRUG TEST PRSMV CHEM ANLYZR: CPT

## 2023-01-20 PROCEDURE — 36415 COLL VENOUS BLD VENIPUNCTURE: CPT

## 2023-01-20 PROCEDURE — 80053 COMPREHEN METABOLIC PANEL: CPT

## 2023-01-20 PROCEDURE — 81003 URINALYSIS AUTO W/O SCOPE: CPT

## 2023-01-20 ASSESSMENT — PAIN - FUNCTIONAL ASSESSMENT
PAIN_FUNCTIONAL_ASSESSMENT: NONE - DENIES PAIN
PAIN_FUNCTIONAL_ASSESSMENT: NONE - DENIES PAIN

## 2023-01-20 ASSESSMENT — ENCOUNTER SYMPTOMS: GASTROINTESTINAL NEGATIVE: 1

## 2023-01-20 NOTE — ED NOTES
Brought by police with court order for mental health eval.  [place] police to bedside to sit wit patient.       [facility], RN  01/20/23 1166
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
## 2023-01-21 ENCOUNTER — HOSPITAL ENCOUNTER (INPATIENT)
Age: 58
LOS: 5 days | Discharge: HOME | End: 2023-01-26
Attending: PSYCHIATRY & NEUROLOGY | Admitting: PSYCHIATRY & NEUROLOGY
Payer: COMMERCIAL

## 2023-01-21 PROBLEM — F23 ACUTE PSYCHOSIS (HCC): Status: ACTIVE | Noted: 2023-01-21

## 2023-01-21 PROBLEM — F31.64 BIPOLAR I DISORDER, MOST RECENT EPISODE MIXED, SEVERE WITH PSYCHOTIC FEATURES (HCC): Status: ACTIVE | Noted: 2023-01-21

## 2023-01-21 PROCEDURE — 99222 1ST HOSP IP/OBS MODERATE 55: CPT | Performed by: INTERNAL MEDICINE

## 2023-01-21 PROCEDURE — 1240000000 HC EMOTIONAL WELLNESS R&B

## 2023-01-21 PROCEDURE — 99232 SBSQ HOSP IP/OBS MODERATE 35: CPT

## 2023-01-21 PROCEDURE — 6370000000 HC RX 637 (ALT 250 FOR IP)

## 2023-01-21 RX ORDER — LEMBOREXANT 10 MG/1
10 TABLET, FILM COATED ORAL NIGHTLY
Status: ON HOLD | COMMUNITY
End: 2023-01-26 | Stop reason: HOSPADM

## 2023-01-21 RX ORDER — DIAPER,BRIEF,INFANT-TODD,DISP
EACH MISCELLANEOUS 2 TIMES DAILY
Status: DISCONTINUED | OUTPATIENT
Start: 2023-01-21 | End: 2023-01-26 | Stop reason: HOSPADM

## 2023-01-21 RX ORDER — TRAZODONE HYDROCHLORIDE 50 MG/1
50 TABLET ORAL NIGHTLY PRN
Status: DISCONTINUED | OUTPATIENT
Start: 2023-01-21 | End: 2023-01-26 | Stop reason: HOSPADM

## 2023-01-21 RX ORDER — GABAPENTIN 300 MG/1
300 CAPSULE ORAL 3 TIMES DAILY
Status: DISCONTINUED | OUTPATIENT
Start: 2023-01-21 | End: 2023-01-22

## 2023-01-21 RX ORDER — MAGNESIUM HYDROXIDE/ALUMINUM HYDROXICE/SIMETHICONE 120; 1200; 1200 MG/30ML; MG/30ML; MG/30ML
30 SUSPENSION ORAL EVERY 6 HOURS PRN
Status: DISCONTINUED | OUTPATIENT
Start: 2023-01-21 | End: 2023-01-26 | Stop reason: HOSPADM

## 2023-01-21 RX ORDER — RISPERIDONE 0.5 MG/1
0.5 TABLET, ORALLY DISINTEGRATING ORAL 2 TIMES DAILY
Status: DISCONTINUED | OUTPATIENT
Start: 2023-01-21 | End: 2023-01-22

## 2023-01-21 RX ORDER — IBUPROFEN 400 MG/1
400 TABLET ORAL EVERY 6 HOURS PRN
Status: DISCONTINUED | OUTPATIENT
Start: 2023-01-21 | End: 2023-01-26 | Stop reason: HOSPADM

## 2023-01-21 RX ORDER — QUETIAPINE FUMARATE 50 MG/1
50 TABLET, EXTENDED RELEASE ORAL NIGHTLY
Status: ON HOLD | COMMUNITY
End: 2023-01-26 | Stop reason: HOSPADM

## 2023-01-21 RX ORDER — HYDROXYZINE 50 MG/1
50 TABLET, FILM COATED ORAL 3 TIMES DAILY PRN
Status: DISCONTINUED | OUTPATIENT
Start: 2023-01-21 | End: 2023-01-26 | Stop reason: HOSPADM

## 2023-01-21 RX ORDER — POLYETHYLENE GLYCOL 3350 17 G/17G
17 POWDER, FOR SOLUTION ORAL DAILY PRN
Status: DISCONTINUED | OUTPATIENT
Start: 2023-01-21 | End: 2023-01-26 | Stop reason: HOSPADM

## 2023-01-21 RX ADMIN — RISPERIDONE 0.5 MG: 0.5 TABLET, ORALLY DISINTEGRATING ORAL at 13:40

## 2023-01-21 RX ADMIN — RISPERIDONE 0.5 MG: 0.5 TABLET, ORALLY DISINTEGRATING ORAL at 22:32

## 2023-01-21 RX ADMIN — GABAPENTIN 300 MG: 300 CAPSULE ORAL at 22:31

## 2023-01-21 RX ADMIN — GABAPENTIN 300 MG: 300 CAPSULE ORAL at 13:40

## 2023-01-21 ASSESSMENT — LIFESTYLE VARIABLES
HOW MANY STANDARD DRINKS CONTAINING ALCOHOL DO YOU HAVE ON A TYPICAL DAY: 1 OR 2
HOW OFTEN DO YOU HAVE A DRINK CONTAINING ALCOHOL: MONTHLY OR LESS
HOW MANY STANDARD DRINKS CONTAINING ALCOHOL DO YOU HAVE ON A TYPICAL DAY: PATIENT DOES NOT DRINK
HOW OFTEN DO YOU HAVE A DRINK CONTAINING ALCOHOL: NEVER

## 2023-01-21 ASSESSMENT — SLEEP AND FATIGUE QUESTIONNAIRES
DO YOU USE A SLEEP AID: NO
DO YOU HAVE DIFFICULTY SLEEPING: YES
SLEEP PATTERN: INSOMNIA
AVERAGE NUMBER OF SLEEP HOURS: 3

## 2023-01-21 ASSESSMENT — PAIN SCALES - GENERAL
PAINLEVEL_OUTOF10: 0
PAINLEVEL_OUTOF10: 0

## 2023-01-21 ASSESSMENT — PATIENT HEALTH QUESTIONNAIRE - PHQ9: SUM OF ALL RESPONSES TO PHQ QUESTIONS 1-9: 17

## 2023-01-21 NOTE — BH NOTE
Patient given tobacco quitline number [phone] at this time, refusing to call at this time, states \" I just dont want to quit now\"- patient given information as to the dangers of long term tobacco use. Continue to reinforce the importance of tobacco cessation.

## 2023-01-21 NOTE — ED NOTES
Appointment made. Report called to Ariella Givnes. Spoke with [employer]. Aware of ETA.      Josiah Chavez RN  01/21/23 [id number]

## 2023-01-21 NOTE — PLAN OF CARE
[facility]  Initial Interdisciplinary Treatment Plan NO      Original treatment plan Date & Time: 1/21/2023  1253    Admission Type:  Admission Type: Involuntary    Reason for admission:   Reason for Admission: Patient states that she has been overwhelmed due to divorce, anniversary of her sons death, and recent surgeries. Patient states she has never said she had suicidal ideations. Patient had mental health court papers with notes stating patient has been demonstrating paranoia and aggression behavior.     Estimated Length of Stay:  5-7days  Estimated Discharge Date: to be determined by physician    PATIENT STRENGTHS:  Patient Strengths:   Patient Strengths and Limitations:   Addictive Behavior: Addictive Behavior  In the Past 3 Months, Have You Felt or Has Someone Told You That You Have a Problem With  : None  Medical Problems:  Past Medical History:   Diagnosis Date    Allergic rhinitis     Anxiety     Arthritis     GERD (gastroesophageal reflux disease)     Impaired fasting glucose     Ligament tear     Rt shoulder [facility] injections    Pure hyperglyceridemia      Status EXAM:Mental Status and Behavioral Exam  Normal: No  Level of Assistance: Independent/Self  Facial Expression: Sad, Exaggerated  Affect: Appropriate  Level of Consciousness: Alert  Frequency of Checks: 4 times per hour, close  Mood:Normal: No  Mood: Depressed, Anxious, Helpless, Sad, Angry  Motor Activity:Normal: Yes  Eye Contact: Fair  Observed Behavior: Cooperative, Preoccupied, Tearful  Sexual Misconduct History: Current - no  Preception: Grethel to person, [place]thel to time, Gre[place] to place, [place] to situation  Attention:Normal: No  Attention: Distractible  Thought Processes: Loose association, Circumstantial  Thought Content:Normal: No  Thought Content: Preoccupations  Depression Symptoms: Feelings of helplessness, Crying, Sleep disturbance  Anxiety Symptoms: Generalized  Gnena Symptoms: Pressured speech  Hallucinations: None  Delusions: No  Memory:Normal: Yes  Insight and Judgment: No  Insight and Judgment: Poor judgment, Poor insight    EDUCATION:   Learner Progress Toward Treatment Goals: reviewed group plans and strategies for care    Method:group therapy, medication compliance, individualized assessments and care planning    Outcome: needs reinforcement    PATIENT GOALS: to be discussed with patient within 72 hours    PLAN/TREATMENT RECOMMENDATIONS:     continue group therapy , medications compliance, goal setting, individualized assessments and care, continue to monitor pt on unit      SHORT-TERM GOALS: Draw boundaries, don't be by bad relationships  Time frame for Short-Term Goals: 5-7 days    LONG-TERM GOALS: Work on physical health to improve mental health, continue with outpatient facility.    Time frame for Long-Term Goals: 6 months  Members Present in Team Meeting: See Signature Sheet    Carolyn Alva RN

## 2023-01-21 NOTE — GROUP NOTE
Group Therapy Note    Date: 1/21/2023    Group Start Time: 1245  Group End Time: 36  Group Topic: Cognitive Skills    [facility]    Pelon Barroso LPN        Group Therapy Note  Anxiety, Coping Strategies, Triggers, Self Reflection  Attendees:        Patient's Goal:  Identify triggers to anxiety & coping strategies. Notes:  Patient actively and appropriately participated    Status After Intervention:  Improved    Participation Level:  Active Listener and Interactive    Participation Quality: Appropriate, Attentive, Sharing, and Supportive      Speech:  normal      Thought Process/Content: Logical      Affective Functioning: Congruent      Mood: Appropriate       Level of consciousness:  Oriented x4      Response to Learning: Able to verbalize current knowledge/experience, Able to verbalize/acknowledge new learning, Able to retain information, and Capable of insight      Endings: None Reported    Modes of Intervention: Education, Support, Socialization, and Problem-solving      Discipline Responsible: Licensed Practical Nurse      Signature:  Pelon Barroso LPN

## 2023-01-21 NOTE — BH NOTE
Patient arrived to unit at [phone] via stretcher accompanied by EMS staff. Patient arrived in personal robe with hospital attire underneath. Patient acceptance of admission process.
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
## 2023-01-21 NOTE — H&P
Department of Psychiatry  Attending Physician Psychiatric Assessment     Reason for Admission to Psychiatric Unit:  {REASON FOR ADMISSION:[id number]::\"Concerns about patient's safety in the community\"}    CHIEF COMPLAINT:  ***    History obtained from: Patient, electronic medical record          HISTORY OF PRESENT ILLNESS:    Jeffery Hernandez is a 59 y.o. female who has a past medical history of ***. Patient presented to the ED ***    Blood alcohol level negative and urine toxin positive for amphetamines on admission.        History of head trauma: [] Yes [] No    History of seizures: [] Yes [] No    History of violence or aggression: [] Yes [] No         PSYCHIATRIC HISTORY:  [] Yes [] No    Currently follows with ***  *** lifetime suicide attempts  *** psychiatric hospital admissions    Home Medication Compliance: [] Yes [] No    Past psychiatric medications includes: ***    Adverse reactions from psychotropic medications: [] Yes [] No         Lifetime Psychiatric Review of Systems         Depression: ***     Anxiety: ***     Panic Attacks: ***     Genna or Hypomania: ***     Phobias: ***     Obsessions and Compulsions: ***     Body or Vocal Tics: ***     Visual Hallucinations: ***     Auditory Hallucinations: ***     Delusions/Paranoia: ***     PTSD: ***    Past Medical History:        Diagnosis Date    Allergic rhinitis     Anxiety     Arthritis     GERD (gastroesophageal reflux disease)     Impaired fasting glucose     Ligament tear     R[facility] injections    Pure hyperglyceridemia        Past Surgical History:        Procedure Laterality Date    APPENDECTOMY  1995    ARTHRODESIS Left 2022    FOOT ARTHRODESIS-1ST MPJ performed by Guanaco Malone DPM at [address] [place] [address]  2011, 2011    CARPAL TUNNEL RELEASE Bilateral 1993     SECTION  1985    breech    COLONOSCOPY  12/15/2015    Dr. Willem Lopez  1989    left wrist    OSTEOTOMY Left 2022    FOOT OSTEOTOMY-WEIL OSTEOTOMY, 2ND METATARSAL, PLANTAR PLATE REPAIR 2ND MPJ performed by Pavel Ochoa DPM at [facility]       Allergies:  Pcn [penicillins], Norco [hydrocodone-acetaminophen], and Sulfa antibiotics         Social History:     Born in: ***  Family: ***  Highest Level of Education: ***  Occupation: ***  Marital Status: ***  Children: ***  Residence: ***  Stressors: ***  Patient Assets/Supportive Factors: ***         DRUG USE HISTORY  Social History     Tobacco Use   Smoking Status Former    Packs/day: 0.25    Years: 30.00    Pack years: 7.50    Types: Cigarettes    Quit date:     Years since quittin.0   Smokeless Tobacco Not on file     Social History     Substance and Sexual Activity   Alcohol Use Not Currently     Social History     Substance and Sexual Activity   Drug Use No       ***          LEGAL HISTORY:   HISTORY OF INCARCERATION: [] Yes [] No    Family History:       Problem Relation Age of Onset    Heart Disease Mother     High Cholesterol Mother     Stroke Mother     Breast Cancer Maternal Grandmother     High Cholesterol Father     High Blood Pressure Sister        Psychiatric Family History  Patient *** psychiatric family history. Suicides in family: [] Yes [] No    Substance use in family: [] Yes [] No         PHYSICAL EXAM:  Vitals:  BP (!) 102/59   Pulse 96   Temp 98 °F (36.7 °C) (Tympanic)   Resp 16   LMP 2009   SpO2 97%   Pain Level: ***    LABS:  Labs reviewed: [x] Yes  Metabolic Screening:  [] Yes [x] No ordered HgBA1C/Lipid panel to be complete    Last EKG in EMR reviewed: [x] Yes  2022        Review of Systems   Constitutional: Negative for chills and weight loss. HENT: Negative for ear pain and nosebleeds. Eyes: Negative for blurred vision and photophobia. Respiratory: Negative for cough, shortness of breath and wheezing. Cardiovascular: Negative for chest pain and palpitations.    Gastrointestinal: Negative for abdominal pain, diarrhea and vomiting. Genitourinary: Negative for dysuria and urgency. Musculoskeletal: Negative for falls and joint pain. Skin: Negative for itching and rash. Neurological: Negative for tremors, seizures and weakness. Endo/Heme/Allergies: Does not bruise/bleed easily. Physical Exam:   Constitutional:  Appears well-developed and well-nourished, no acute distress. HENT:   Head: Normocephalic and atraumatic. Eyes: Conjunctivae are normal. Right eye exhibits no discharge. Left eye exhibits no discharge. No scleral icterus. Neck: Normal range of motion. Neck supple. Pulmonary/Chest:  No respiratory distress or accessory muscle use, no wheezing. Cardiac: Regular rate and rhythm. Abdominal: Soft. Non-tender. Exhibits no distension. Musculoskeletal: Normal range of motion. Exhibits no edema. Neurological: cranial nerves II-XII grossly in tact, normal gait and station. Skin: Skin is warm and dry. Patient is not diaphoretic. No erythema. Mental Status Examination:    Level of consciousness: Awake and alert  Appearance:  Appropriate attire, {bgseated:44155}, fair grooming   Behavior/Motor: Approachable, engages with interviewer, no psychomotor abnormalities  Attitude toward examiner:  Cooperative, attentive, good eye contact  Speech: Normal rate, volume, and tone. Mood: ***  Affect: ***  Thought processes:  Goal directed, linear  Thought content: {bgsuicidal:64593} suicidal ideations, {bglist2:59066} current plan or intent, contracts for safety on the unit.                Denies homicidal ideations               Denies hallucinations              Denies delusions              Denies paranoia  Cognition:  Oriented to self, location, time, situation  Concentration: Clinically adequate  Memory: Intact  Insight &Judgment: Poor         DSM-5 Diagnosis    Principal Problem: <principal problem not specified>    ***  Acute Exacerbation of Chronic Paranoid Schizophrenia  Major depressive disorder, recurrent, severe, {WITH/WITHOUT:19566} psychosis   Bipolar disorder, current episode ***, severe, {WITH/WITHOUT:19566} psychosis   Acute Psychosis  Depression with suicidal ideation  Schizoaffective disorder bipolar type/depressed type  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  Substance induced mood disorder/psychosis  Cocaine abuse/dependence/withdrawal  PTSD  EBER   Panic disorder with/without agoraphobia  Delirium secondary to ***  Dementia with behavioral disturbance    Psychosocial and Contextual factors:  Financial   Occupational   Relationship   Legal   Living situation   Educational     Past Medical History:   Diagnosis Date    Allergic rhinitis     Anxiety     Arthritis     GERD (gastroesophageal reflux disease)     Impaired fasting glucose     Ligament tear     R[facility] injections    Pure hyperglyceridemia         PLAN:  Continue inpatient psychiatric treatment. Home medications reviewed. ***  Monitor need and frequency of PRN medications. Attempt to develop insight. Follow-up daily while inpatient. Reviewed medications risks and benefits as well as potential side effects with patient. CONSULT:  [] Yes [] No  Internal medicine for medical management/medical H&P  ***    Risk Management: close watch per standard protocol      Psychotherapy: participation in milieu and group and individual sessions with Attending Physician,  and Physician Assistant/CNP      Estimated length of stay:  2-14 days      GENERAL PATIENT/FAMILY EDUCATION  Patient will understand basic signs and symptoms, patient will understand benefits/risks and potential side effects from proposed medications, and patient will understand their role in recovery. Family is *** active in patient's care.    Patient assets that may be helpful during treatment include: Intent to participate and engage in treatment, sufficient fund of knowledge and intellect to understand and utilize treatments. Goals:    1) Remission of acute psychosis. 2) Stabilization of symptoms prior to discharge. 3) Establish efficacy and tolerability of medications. Behavioral Services  Medicare Certification     Admission Day 1  I certify that this patient's inpatient psychiatric hospital admission is medically necessary for:    x (1) treatment which could reasonably be expected to improve this patient's condition, or    x (2) diagnostic study or its equivalent. Time Spent: 60 minutes    Alayna Pro is a 59 y.o. female being evaluated face to face    --MAURICE Diaz CNP on 1/21/2023 at 8:00 AM    An electronic signature was used to authenticate this note. Please note that this chart was generated using voice recognition Dragon dictation software. Although every effort was made to ensure the accuracy of this automated transcription, some errors in transcription may have occurred.

## 2023-01-21 NOTE — H&P
[facility] Internal Medicine    CONSULTATION / HISTORY AND PHYSICAL EXAMINATION            Date:   2023  Patient name:  Bhavna Archer  Date of admission:  2023  9:29 AM  MRN:   [id number]  Account:  [de-identified]  YOB: 1965  PCP:    Carlee Lowe MD  Room:   [facility]0126-02  Code Status:    Full Code    Physician Requesting Consult: Rod Sanches MD    Reason for Consult:  medical management    Chief Complaint:     No chief complaint on file. History Obtained From:     Patient medical record nursing staff    History of Present Illness:   Patient, has past medical history of bipolar disorder, opiate abuse, admitted to [facility] floor with aggressive behavior  Patient, noticed a rash in her left hand, going on for last few days, associated with itching at the local site      Past Medical History:     Past Medical History:   Diagnosis Date    Allergic rhinitis     Anxiety     Arthritis     GERD (gastroesophageal reflux disease)     Impaired fasting glucose     Ligament tear     R[facility] injections    Pure hyperglyceridemia         Past Surgical History:     Past Surgical History:   Procedure Laterality Date    APPENDECTOMY  1995    ARTHRODESIS Left 2022    FOOT ARTHRODESIS-1ST MPJ performed by Lashell Shaver DPM at [facility]  2011, 2011    CARPAL TUNNEL RELEASE Bilateral 1993     SECTION  1985    breech    COLONOSCOPY  12/15/2015    Dr. Beverley Jimenez  1989    left wrist    OSTEOTOMY Left 2022    FOOT OSTEOTOMY-WEIL OSTEOTOMY, 2ND METATARSAL, PLANTAR PLATE REPAIR 2ND MPJ performed by Lashell Shaver DPM at [address] [facility]        Medications Prior to Admission:     Prior to Admission medications    Medication Sig Start Date End Date Taking?  Authorizing Provider   QUEtiapine (SEROQUEL XR) 50 MG extended release tablet Take 50 mg by mouth nightly   Yes Historical Provider, MD Lemborexant (DAYVIGO) 10 MG TABS Take 10 mg by mouth at bedtime    Historical Provider, MD   zaleplon (SONATA) 5 MG capsule Take 5 mg by mouth. Patient not taking: No sig reported    Historical Provider, MD   amphetamine-dextroamphetamine (ADDERALL) 20 MG tablet Take 30 mg by mouth 2 times daily. 1/2 tablet twice a day    Historical Provider, MD   loratadine (CLARITIN) 10 MG tablet Take 10 mg by mouth daily  Patient not taking: No sig reported    Historical Provider, MD   gabapentin (NEURONTIN) 300 MG capsule Take 1 capsule by mouth 3 times daily. Patient taking differently: Take 400 mg by mouth 3 times daily. 1/29/15   Jaycee Lane MD   QUEtiapine (SEROQUEL) 25 MG tablet Take 300 mg by mouth nightly    Historical Provider, MD        Allergies:     Pcn [penicillins], Norco [hydrocodone-acetaminophen], and Sulfa antibiotics    Social History:     Tobacco:    reports that she quit smoking about 5 years ago. Her smoking use included cigarettes. She has a 7.50 pack-year smoking history. She does not have any smokeless tobacco history on file. Alcohol:      reports that she does not currently use alcohol. Drug Use:  reports no history of drug use. Family History:     Family History   Problem Relation Age of Onset    Heart Disease Mother     High Cholesterol Mother     Stroke Mother     Breast Cancer Maternal Grandmother     High Cholesterol Father     High Blood Pressure Sister        Review of Systems:     Positive and Negative as described in HPI. CONSTITUTIONAL:  negative for fevers, chills, sweats, fatigue, weight loss  HEENT:  negative for vision, hearing changes, runny nose, throat pain  RESPIRATORY:  negative for shortness of breath, cough, congestion, wheezing. CARDIOVASCULAR:  negative for chest pain, palpitations.   GASTROINTESTINAL:  negative for nausea, vomiting, diarrhea, constipation, change in bowel habits, abdominal pain   GENITOURINARY:  negative for difficulty of urination, burning with urination, frequency   INTEGUMENT:  negative for rash, skin lesions, easy bruising   HEMATOLOGIC/LYMPHATIC:  negative for swelling/edema   ALLERGIC/IMMUNOLOGIC:  negative for urticaria , itching  ENDOCRINE:  negative increase in drinking, increase in urination, hot or cold intolerance  MUSCULOSKELETAL:  Rash in left hand NEUROLOGICAL:  negative for headaches, dizziness, lightheadedness, numbness, pain, tingling extremities  BEHAVIOR/PSYCH:  depressed     Physical Exam:     /82 Comment: Rechecked manually  Pulse (!) 122 Comment: Patient visibally upset, crying during assessment  Temp 97.9 °F (36.6 °C) (Temporal)   Resp 14   Ht 5' 5\" (1.651 m)   Wt 132 lb 12.8 oz (60.2 kg)   LMP 2009   BMI 22.10 kg/m²   Temp (24hrs), Av °F (36.7 °C), Min:97.9 °F (36.6 °C), Max:98 °F (36.7 °C)    No results for input(s): POCGLU in the last 72 hours. No intake or output data in the 24 hours ending 23    General Appearance:  alert, well appearing, and in no acute distress  Mental status: oriented to person, place, and time with normal affect  Head:  normocephalic, atraumatic. Eye: no icterus, redness, pupils equal and reactive, extraocular eye movements intact, conjunctiva clear  Ear: normal external ear, no discharge, hearing intact  Nose:  no drainage noted  Mouth: mucous membranes moist  Neck: supple, no carotid bruits, thyroid not palpable  Lungs: Bilateral equal air entry, clear to ausculation, no wheezing, rales or rhonchi, normal effort  Cardiovascular: normal rate, regular rhythm, no murmur, gallop, rub.   Abdomen: Soft, nontender, nondistended, normal bowel sounds, no hepatomegaly or splenomegaly  Neurologic: There are no new focal motor or sensory deficits, normal muscle tone and bulk, no abnormal sensation, normal speech, cranial nerves II through XII grossly intact  Skin: No gross lesions, rashes, bruising or bleeding on exposed skin area  Extremities:  rash, with Redness left hand Psych:     Investigations:      Laboratory Testing:  Recent Results (from the past 24 hour(s))   Urinalysis    Collection Time: 01/20/23  7:25 PM   Result Value Ref Range    Color, UA Yellow Yellow    Turbidity UA Clear Clear    Glucose, Ur NEGATIVE NEGATIVE    Bilirubin Urine NEGATIVE NEGATIVE    Ketones, Urine NEGATIVE NEGATIVE    Specific [place], UA 1.010 1.010 - 1.020    Urine Hgb NEGATIVE NEGATIVE    pH, UA 7.0 5.0 - 9.0    Protein, UA NEGATIVE NEGATIVE    Urobilinogen, Urine Normal Normal    Nitrite, Urine NEGATIVE NEGATIVE    Leukocyte Esterase, Urine NEGATIVE NEGATIVE   Drug screen multi urine    Collection Time: 01/20/23  7:25 PM   Result Value Ref Range    Amphetamine Screen, Ur POSITIVE (A) NEGATIVE    Barbiturate Screen, Ur NEGATIVE NEGATIVE    Benzodiazepine Screen, Urine NEGATIVE NEGATIVE    Cocaine Metabolite, Urine NEGATIVE NEGATIVE    Methadone Screen, Urine NEGATIVE NEGATIVE    Opiates, Urine NEGATIVE NEGATIVE    Phencyclidine, Urine NEGATIVE NEGATIVE    Cannabinoid Scrn, Ur NEGATIVE NEGATIVE    Oxycodone Screen, Ur NEGATIVE NEGATIVE    Fentanyl, Ur NEGATIVE NEGATIVE    Buprenorphine Urine NEGATIVE NEGATIVE           Consultations:   IP CONSULT TO INTERNAL MEDICINE  IP CONSULT TO DIETITIAN  Assessment :      Primary Problem  Bipolar I disorder, most recent episode mixed, severe with psychotic features [facility])    Active Hospital Problems    Diagnosis Date Noted    Acute psychosis ([facility] [address] [F23] 01/21/2023     Priority: Medium    Bipolar I disorder, most recent episode mixed, severe with psychotic features [facility]) [F31.64] 01/21/2023     Priority: Medium       Plan:     Patient, admitted with acute psychosis, managed by psychiatrist  Rash in left hand, likely allergic, will add hydrocortisone cream   Will order TSH , reviewed rest of the lab work        Maria Ines Hemphill MD  1/21/2023  6:18 PM    Copy sent to Dr. Paulette Zayas MD    Please note that this chart was generated using voice recognition Dragon dictation software. Although every effort was made to ensure the accuracy of this automated transcription, some errors in transcription may have occurred.

## 2023-01-21 NOTE — BH NOTE
[id number] [facility]  Admission Note     Admission Type:   Admission Type: Involuntary    Reason for admission:  Reason for Admission: Patient states that she has been overwhelmed due to divorce, anniversary of her sons death, and recent surgeries. Patient states she has never said she had suicidal ideations. Patient had mental health court papers with notes stating patient has been demonstrating paranoia and aggression behavior.       Addictive Behavior:   Addictive Behavior  In the Past 3 Months, Have You Felt or Has Someone Told You That You Have a Problem With  : None    Medical Problems:   Past Medical History:   Diagnosis Date    Allergic rhinitis     Anxiety     Arthritis     GERD (gastroesophageal reflux disease)     Impaired fasting glucose     Ligament tear     R[facility] injections    Pure hyperglyceridemia        Status EXAM:  Mental Status and Behavioral Exam  Normal: No  Level of Assistance: Independent/Self  Facial Expression: Sad, Exaggerated  Affect: Appropriate  Level of Consciousness: Alert  Frequency of Checks: 4 times per hour, close  Mood:Normal: No  Mood: Depressed, Anxious, Helpless, Sad, Angry  Motor Activity:Normal: Yes  Eye Contact: Fair  Observed Behavior: Cooperative, Preoccupied, Tearful  Sexual Misconduct History: Current - no  Preception: [place] to person, [place] to time, [place] to place, [place] to situation  Attention:Normal: No  Attention: Distractible  Thought Processes: Loose association, Circumstantial  Thought Content:Normal: No  Thought Content: Preoccupations  Depression Symptoms: Feelings of helplessness, Crying, Sleep disturbance  Anxiety Symptoms: Generalized  Genna Symptoms: Pressured speech  Hallucinations: None  Delusions: No  Memory:Normal: Yes  Insight and Judgment: No  Insight and Judgment: Poor judgment, Poor insight    Tobacco Screening:  Practical Counseling, on admission, sumi X, if applicable and completed (first 3 are required if patient doesn't refuse):            ( ) Recognizing danger situations (included triggers and roadblocks)                    ( ) Coping skills (new ways to manage stress,relaxation techniques, changing routine, distraction)                                                           ( ) Basic information about quitting (benefits of quitting, techniques in how to quit, available resources  ( ) Referral for counseling faxed to Nancy                                                                                                                   ( x) Patient refused counseling  ( ) Patient has not smoked in the last 30 days    Metabolic Screening:    Lab Results   Component Value Date    LABA1C 4.9 07/28/2014       Lab Results   Component Value Date    CHOL 156 12/07/2015    CHOL 171 07/28/2014    CHOL 175 11/21/2013    CHOL 125 11/24/2012    CHOL 182 08/17/2012     Lab Results   Component Value Date    TRIG 128 12/07/2015    TRIG 115 07/28/2014    TRIG 156 (H) 11/21/2013    TRIG 85 11/24/2012    TRIG 251 (H) 08/17/2012     Lab Results   Component Value Date    HDL 42 12/07/2015    HDL 60 07/28/2014    HDL 61 11/21/2013    HDL 59 11/24/2012    HDL 59 08/17/2012     No components found for: LDLCAL  No results found for: LABVLDL      Body mass index is 22.1 kg/m². BP Readings from Last 2 Encounters:   01/21/23 132/82   01/20/23 (!) 102/59           Pt admitted with followings belongings:  Dental Appliances: None  Vision - Corrective Lenses: Eyeglasses, At bedside  Hearing Aid: None  Jewelry: Body Piercing, Earrings  Body Piercings Removed: No  Clothing: Robe, Shirt, Shorts, Pajamas  Other Valuables: Money, Wallet, Keys, Lighter/Matches, Cigarettes      Pt admitted to unit [facility] at 8724 -2 per provider order. Pt scanned with metal detector. Nourishment provided. Provider paged for orders. Will monitor pt for safety and behavior.  Patient signed all admission paperwork and was cooperative with assessment.      Irais Peraza RN
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
## 2023-01-21 NOTE — ED NOTES
Called [facility] to start transfer- waiting on call back     [address]  01/20/23 2039

## 2023-01-21 NOTE — GROUP NOTE
Group Therapy Note    Date: 2023    Group Start Time:   Group End Time:   Group Topic: Music Therapy    PEEWEE MITCHELL    Mayra Denis        Group Therapy Note    Attendees:      Patient's Goal:  Patients shared songs that they dedicated to important people in their lives. Patients answered questions about these relationships and talked about thoughts they had of these people as brought up by their music. Goals to reflect on relationships; Increase sense of community; Normalization of the environment; Increase self-expression    Notes:  Patient attended and participated in group having positive interactions with peers and staff throughout. Patient was pleasant and engaging. Shared a song and dedicated it to her  son, talking about how when he was incarcerated their whole family shared the pain of being , and now having a more permanent separation with him being . Patient was often interactive and offering supportive and comforting words to peers who were sharing throughout. Occasionally tearful during songs and singing along to music as well    Status After Intervention:  Improved    Participation Level:  Active Listener and Interactive    Participation Quality: Appropriate, Attentive, Sharing, and Supportive      Speech:  normal      Thought Process/Content: Logical  Linear      Affective Functioning: Congruent      Mood: euthymic      Level of consciousness:  Alert and Attentive      Response to Learning: Able to verbalize current knowledge/experience and Progressing to goal      Endings: None Reported    Modes of Intervention: Support, Socialization, Exploration, Activity, Media, and Reality-testing      Discipline Responsible: Psychoeducational Specialist      Signature:  Mayra Denis

## 2023-01-22 LAB
CHOLESTEROL/HDL RATIO: 3.8
CHOLESTEROL: 196 MG/DL
HDLC SERPL-MCNC: 52 MG/DL
LDL CHOLESTEROL: 121 MG/DL (ref 0–130)
TRIGL SERPL-MCNC: 115 MG/DL
TSH SERPL DL<=0.05 MIU/L-ACNC: 0.86 UIU/ML (ref 0.3–5)

## 2023-01-22 PROCEDURE — 99231 SBSQ HOSP IP/OBS SF/LOW 25: CPT | Performed by: INTERNAL MEDICINE

## 2023-01-22 PROCEDURE — 83036 HEMOGLOBIN GLYCOSYLATED A1C: CPT

## 2023-01-22 PROCEDURE — 1240000000 HC EMOTIONAL WELLNESS R&B

## 2023-01-22 PROCEDURE — 6370000000 HC RX 637 (ALT 250 FOR IP): Performed by: PSYCHIATRY & NEUROLOGY

## 2023-01-22 PROCEDURE — 36415 COLL VENOUS BLD VENIPUNCTURE: CPT

## 2023-01-22 PROCEDURE — 99232 SBSQ HOSP IP/OBS MODERATE 35: CPT

## 2023-01-22 PROCEDURE — 6370000000 HC RX 637 (ALT 250 FOR IP): Performed by: NURSE PRACTITIONER

## 2023-01-22 PROCEDURE — 6370000000 HC RX 637 (ALT 250 FOR IP): Performed by: INTERNAL MEDICINE

## 2023-01-22 PROCEDURE — 6370000000 HC RX 637 (ALT 250 FOR IP)

## 2023-01-22 PROCEDURE — 80061 LIPID PANEL: CPT

## 2023-01-22 PROCEDURE — 84443 ASSAY THYROID STIM HORMONE: CPT

## 2023-01-22 RX ORDER — GABAPENTIN 100 MG/1
200 CAPSULE ORAL 3 TIMES DAILY
Status: DISCONTINUED | OUTPATIENT
Start: 2023-01-22 | End: 2023-01-26 | Stop reason: HOSPADM

## 2023-01-22 RX ORDER — RISPERIDONE 1 MG/1
1 TABLET, ORALLY DISINTEGRATING ORAL 2 TIMES DAILY
Status: DISCONTINUED | OUTPATIENT
Start: 2023-01-22 | End: 2023-01-26 | Stop reason: HOSPADM

## 2023-01-22 RX ADMIN — HYDROCORTISONE: 1 CREAM TOPICAL at 15:08

## 2023-01-22 RX ADMIN — NICOTINE POLACRILEX 2 MG: 2 GUM, CHEWING BUCCAL at 19:27

## 2023-01-22 RX ADMIN — HYDROXYZINE HYDROCHLORIDE 50 MG: 50 TABLET, FILM COATED ORAL at 09:05

## 2023-01-22 RX ADMIN — RISPERIDONE 0.5 MG: 0.5 TABLET, ORALLY DISINTEGRATING ORAL at 08:05

## 2023-01-22 RX ADMIN — HYDROXYZINE HYDROCHLORIDE 50 MG: 50 TABLET, FILM COATED ORAL at 20:44

## 2023-01-22 RX ADMIN — NICOTINE POLACRILEX 2 MG: 2 GUM, CHEWING BUCCAL at 15:09

## 2023-01-22 RX ADMIN — RISPERIDONE 1 MG: 1 TABLET, ORALLY DISINTEGRATING ORAL at 20:44

## 2023-01-22 RX ADMIN — TRAZODONE HYDROCHLORIDE 50 MG: 50 TABLET ORAL at 20:44

## 2023-01-22 RX ADMIN — POLYETHYLENE GLYCOL 3350 17 G: 17 POWDER, FOR SOLUTION ORAL at 11:43

## 2023-01-22 RX ADMIN — IBUPROFEN 400 MG: 400 TABLET, FILM COATED ORAL at 09:07

## 2023-01-22 RX ADMIN — GABAPENTIN 300 MG: 300 CAPSULE ORAL at 08:05

## 2023-01-22 RX ADMIN — NICOTINE POLACRILEX 2 MG: 2 GUM, CHEWING BUCCAL at 11:43

## 2023-01-22 ASSESSMENT — PAIN SCALES - GENERAL
PAINLEVEL_OUTOF10: 0
PAINLEVEL_OUTOF10: 0
PAINLEVEL_OUTOF10: 3

## 2023-01-22 ASSESSMENT — LIFESTYLE VARIABLES
HOW OFTEN DO YOU HAVE A DRINK CONTAINING ALCOHOL: NEVER
HOW MANY STANDARD DRINKS CONTAINING ALCOHOL DO YOU HAVE ON A TYPICAL DAY: PATIENT DOES NOT DRINK

## 2023-01-22 ASSESSMENT — PAIN DESCRIPTION - LOCATION: LOCATION: HEAD

## 2023-01-22 NOTE — GROUP NOTE
Group Therapy Note    Date: 1/22/2023    Group Start Time: 1350  Group End Time: 9688  Group Topic: Music Therapy    PEEWEE MITCHELL    Mayrarosi Denis    Music Therapy Group Note        Date: 1/22/2023   Start Time: 1350  End Time: 3106      Number of Participants in Group & Unit Census:  7/20    Topic: Patients worked together to create a play list of music, going from calm/relaxing music, to gradually becoming more energizing music. Goal of Group: Goals to elevate mood/energy; Increase socialization; Normalization of the environment;       Comments:     Patient did not participate in Music Therapy group, despite staff encouragement and explanation of benefits. Patient remain seclusive to self. Q15 minute safety checks maintained for patient safety and will continue to encourage patient to attend unit programming.

## 2023-01-22 NOTE — PROGRESS NOTES
[facility] hospital Internal Medicine    CONSULTATION / HISTORY AND PHYSICAL EXAMINATION            Date:   2023  Patient name:  Nestor Wells  Date of admission:  2023  9:29 AM  MRN:   [id number]  Account:  [de-identified]  YOB: 1965  PCP:    Paulette Zayas MD  [place]:   [facility] [place] 2]0126-02  Code Status:    Full Code    Physician Requesting Consult: Heather Stoll MD    Reason for Consult:  medical management    Chief Complaint:     No chief complaint on file. History Obtained From:     Patient medical record nursing staff    History of Present Illness:   Patient, has past medical history of bipolar disorder, opiate abuse, admitted to [facility] floor with aggressive behavior  Patient, noticed a rash in her left hand, going on for last few days, associated with itching at the local site      Past Medical History:     Past Medical History:   Diagnosis Date    Allergic rhinitis     Anxiety     Arthritis     GERD (gastroesophageal reflux disease)     Impaired fasting glucose     Ligament tear     Rt shoulder [facility] injections    Pure hyperglyceridemia         Past Surgical History:     Past Surgical History:   Procedure Laterality Date    APPENDECTOMY  1995    ARTHRODESIS Left 2022    FOOT ARTHRODESIS-1ST MPJ performed by Bee Strickland DPM at [facility]  2011, 2011    CARPAL TUNNEL RELEASE Bilateral 1993     SECTION  1985    breech    COLONOSCOPY  12/15/2015    Dr. Maryuri Macias  1989    left wrist    OSTEOTOMY Left 2022    FOOT OSTEOTOMY-WEIL OSTEOTOMY, 2ND METATARSAL, PLANTAR PLATE REPAIR 2ND MPJ performed by Bee Strickland DPM at [address]        Medications Prior to Admission:     Prior to Admission medications    Medication Sig Start Date End Date Taking?  Authorizing Provider   QUEtiapine (SEROQUEL XR) 50 MG extended release tablet Take 50 mg by mouth nightly   Yes Historical Provider, MD Lemborexant (DAYVIGO) 10 MG TABS Take 10 mg by mouth at bedtime    Historical Provider, MD   zaleplon (SONATA) 5 MG capsule Take 5 mg by mouth. Patient not taking: No sig reported    Historical Provider, MD   amphetamine-dextroamphetamine (ADDERALL) 20 MG tablet Take 30 mg by mouth 2 times daily. 1/2 tablet twice a day    Historical Provider, MD   loratadine (CLARITIN) 10 MG tablet Take 10 mg by mouth daily  Patient not taking: No sig reported    Historical Provider, MD   gabapentin (NEURONTIN) 300 MG capsule Take 1 capsule by mouth 3 times daily. Patient taking differently: Take 400 mg by mouth 3 times daily. 1/29/15   Juanjo Mcfadden MD   QUEtiapine (SEROQUEL) 25 MG tablet Take 300 mg by mouth nightly    Historical Provider, MD        Allergies:     Pcn [penicillins], Norco [hydrocodone-acetaminophen], and Sulfa antibiotics    Social History:     Tobacco:    reports that she has been smoking cigarettes. She has a 15.00 pack-year smoking history. She does not have any smokeless tobacco history on file. Alcohol:      reports that she does not currently use alcohol. Drug Use:  reports no history of drug use. Family History:     Family History   Problem Relation Age of Onset    Heart Disease Mother     High Cholesterol Mother     Stroke Mother     Breast Cancer Maternal Grandmother     High Cholesterol Father     High Blood Pressure Sister        Review of Systems:     Positive and Negative as described in HPI. CONSTITUTIONAL:  negative for fevers, chills, sweats, fatigue, weight loss  HEENT:  negative for vision, hearing changes, runny nose, throat pain  RESPIRATORY:  negative for shortness of breath, cough, congestion, wheezing. CARDIOVASCULAR:  negative for chest pain, palpitations.   GASTROINTESTINAL:  negative for nausea, vomiting, diarrhea, constipation, change in bowel habits, abdominal pain   GENITOURINARY:  negative for difficulty of urination, burning with urination, frequency   INTEGUMENT: negative for rash, skin lesions, easy bruising   HEMATOLOGIC/LYMPHATIC:  negative for swelling/edema   ALLERGIC/IMMUNOLOGIC:  negative for urticaria , itching  ENDOCRINE:  negative increase in drinking, increase in urination, hot or cold intolerance  MUSCULOSKELETAL:  Rash in left hand NEUROLOGICAL:  negative for headaches, dizziness, lightheadedness, numbness, pain, tingling extremities  BEHAVIOR/PSYCH:  depressed     Physical Exam:     BP 97/77   Pulse 98   Temp 97.9 °F (36.6 °C) (Temporal)   Resp 14   Ht 5' 5\" (1.651 m)   Wt 132 lb 12.8 oz (60.2 kg)   LMP 2009   BMI 22.10 kg/m²   Temp (24hrs), Av °F (36.7 °C), Min:97.9 °F (36.6 °C), Max:98 °F (36.7 °C)    No results for input(s): POCGLU in the last 72 hours. No intake or output data in the 24 hours ending 23 1512    General Appearance:  alert, well appearing, and in no acute distress  Mental status: oriented to person, place, and time with normal affect  Head:  normocephalic, atraumatic. Eye: no icterus, redness, pupils equal and reactive, extraocular eye movements intact, conjunctiva clear  Ear: normal external ear, no discharge, hearing intact  Nose:  no drainage noted  Mouth: mucous membranes moist  Neck: supple, no carotid bruits, thyroid not palpable  Lungs: Bilateral equal air entry, clear to ausculation, no wheezing, rales or rhonchi, normal effort  Cardiovascular: normal rate, regular rhythm, no murmur, gallop, rub. Abdomen: Soft, nontender, nondistended, normal bowel sounds, no hepatomegaly or splenomegaly  Neurologic: There are no new focal motor or sensory deficits, normal muscle tone and bulk, no abnormal sensation, normal speech, cranial nerves II through XII grossly intact  Skin: No gross lesions, rashes, bruising or bleeding on exposed skin area  Extremities:  rash, with Redness left hand   Psych:      Investigations:      Laboratory Testing:  Recent Results (from the past 24 hour(s))   Lipid Panel    Collection Time: 01/22/23  7:17 AM   Result Value Ref Range    Cholesterol 196 <200 mg/dL    HDL 52 >40 mg/dL    LDL Cholesterol 121 0 - 130 mg/dL    Chol/HDL Ratio 3.8 <5    Triglycerides 115 <150 mg/dL   TSH WITH REFLEX    Collection Time: 01/22/23  7:17 AM   Result Value Ref Range    TSH 0.86 0.30 - 5.00 uIU/mL           Consultations:   IP CONSULT TO INTERNAL MEDICINE  IP CONSULT TO DIETITIAN  Assessment :      Primary Problem  Bipolar I disorder, most recent episode mixed, severe with psychotic features [facility])    Active Hospital Problems    Diagnosis Date Noted    Acute psychosis ([facility] [address] [F23] 01/21/2023     Priority: Medium    Bipolar I disorder, most recent episode mixed, severe with psychotic features [facility]) [F31.64] 01/21/2023     Priority: Medium       Plan:     Patient, admitted with acute psychosis, managed by psychiatrist  Rash in left hand, likely allergic, will add hydrocortisone cream   Will order TSH , reviewed rest of the lab work    1/22   No new complaints  We will sign off, please call with questions    Winnie Closs, MD  1/22/2023  3:12 PM    Copy sent to Dr. Talya Handy MD    Please note that this chart was generated using voice recognition Dragon dictation software. Although every effort was made to ensure the accuracy of this automated transcription, some errors in transcription may have occurred.

## 2023-01-22 NOTE — PROGRESS NOTES
Daily Progress Note  1/22/2023    Patient Name: Paulina Perez:  Paranoia and verbally aggressive behavior          SUBJECTIVE:      Patient is seen today for a follow up assessment. She has been compliant with scheduled medication. Patient is hyperverbal, focused on talking about her  and how she is here due to him however has remained in behavioral control and not required any emergency medication last 24 hours. Patient continues to display some paranoid behavior and disorganized speech and requires redirection to engage in conversation. She reports being tearful in the morning due to thinking about situation with . Patient voices having anxiety and has been utilizing Atarax which she states has been beneficial.  Patient is seen out in milieu, social with peers however has not been participating in group programming on the unit. She reports improvement in sleep and oral intake. Patient reports to be tolerating Risperdal with no side effects, discussed titration to help with his symptoms. Patient is agreeable to taper off of Neurontin which she states has been using for \"pinched nerve on her shoulder\" which has resolved per patient. She is encouraged to engage in group programming and coping skills to help with stability in mood. Patient not a candidate for lower level of care due to instability symptoms and safety.     Appetite:  [] Adequate/Unchanged  [x] Increased  [] Decreased      Sleep:       [] Adequate/Unchanged  [x] Fair  [] Poor      Group Attendance on Unit:   [] Yes   [] Selectively    [x] No    Compliant with scheduled medications: [x] Yes  [] No    Received emergency medications in past 24 hrs: [] Yes   [x] No    Medication Side Effects: Denies         Mental Status Exam  Level of consciousness: Awake and alert  Appearance:  Appropriate attire, seated in chair, fair grooming,   Behavior/Motor: Approachable, minimal engagement with interviewer, preoccupied,   Attitude toward examiner:Cooperative, inattentive, distractible, fair eye contact  Speech: Normal rate, loud volume, and tone, hyperverbal speech  Mood: anxious,   Affect: Blunted  Thought processes: Tangential, loose association, circumstantial, disorganized, preoccupied   Thought content: Denies suicidal ideations, without current plan or intent, unable to contracts for safety off the unit. Denies homicidal ideations               Denies hallucinations              Denies delusions              Endorses paranoia  Cognition:  Oriented to self, location, disorganized to time and situation concentration: Distractible  Memory: Impaired  Insight &Judgment: Poor    Data   height is 5' 5\" (1.651 m) and weight is 132 lb 12.8 oz (60.2 kg). Her temporal temperature is 97.9 °F (36.6 °C). Her blood pressure is 97/77 and her pulse is 98. Her respiration is 14. Labs:   Admission on 01/21/2023   Component Date Value Ref Range Status    Cholesterol 01/22/2023 196  <200 mg/dL Final    Comment:    Cholesterol Guidelines:      <200  Desirable   200-240  Borderline      >240  Undesirable         HDL 01/22/2023 52  >40 mg/dL Final    Comment:    HDL Guidelines:    <40     Undesirable   40-59    Borderline    >59     Desirable         LDL Cholesterol 01/22/2023 121  0 - 130 mg/dL Final    Comment:    LDL Guidelines:     <100    Desirable   100-129   Near to/above Desirable   130-159   Borderline      >159   Undesirable     Direct (measured) LDL and calculated LDL are not interchangeable tests. Chol/HDL Ratio 01/22/2023 3.8  <5 Final            Triglycerides 01/22/2023 115  <150 mg/dL Final    Comment:    Triglyceride Guidelines:     <150   Desirable   150-199  Borderline   200-499  High     >499   Very high   Based on AHA Guidelines for fasting triglyceride, October 2012.          TSH 01/22/2023 0.86  0.30 - 5.00 uIU/mL Final   Admission on 01/20/2023, Discharged on 01/21/2023   Component Date Value Ref Range Status    Glucose 01/20/2023 102 (A)  70 - 99 mg/dL Final    BUN 01/20/2023 5 (A)  6 - 20 mg/dL Final    Creatinine 01/20/2023 0.64  0.50 - 0.90 mg/dL Final    Est, Glom Filt Rate 01/20/2023 >60  >60 mL/min/1.73m2 Final    Comment:       Effective Oct 3, 2022        These results are not intended for use in patients <25years of age. eGFR results are calculated without a race factor using the 2021 CKD-EPI equation. Careful clinical correlation is recommended, particularly when comparing to results   calculated using previous equations. The CKD-EPI equation is less accurate in patients with extremes of muscle mass, extra-renal   metabolism of creatine, excessive creatine ingestion, or following therapy that affects   renal tubular secretion.       Bun/Cre Ratio 01/20/2023 8 (A)  9 - 20 Final    Calcium 01/20/2023 10.0  8.6 - 10.4 mg/dL Final    Sodium 01/20/2023 138  135 - 144 mmol/L Final    Potassium 01/20/2023 3.6 (A)  3.7 - 5.3 mmol/L Final    Chloride 01/20/2023 103  98 - 107 mmol/L Final    CO2 01/20/2023 24  20 - 31 mmol/L Final    Anion Gap 01/20/2023 11  9 - 17 mmol/L Final    Alkaline Phosphatase 01/20/2023 96  35 - 104 U/L Final    ALT 01/20/2023 14  5 - 33 U/L Final    AST 01/20/2023 15  <32 U/L Final    Total Bilirubin 01/20/2023 0.4  0.3 - 1.2 mg/dL Final    Total Protein 01/20/2023 6.9  6.4 - 8.3 g/dL Final    Albumin 01/20/2023 4.2  3.5 - 5.2 g/dL Final    Albumin/Globulin Ratio 01/20/2023 1.6  1.0 - 2.5 Final    WBC 01/20/2023 8.2  3.5 - 11.3 k/uL Final    RBC 01/20/2023 4.67  3.95 - 5.11 m/uL Final    Hemoglobin 01/20/2023 15.1  11.9 - 15.1 g/dL Final    Hematocrit 01/20/2023 43.8  36.3 - 47.1 % Final    MCV 01/20/2023 93.8  82.6 - 102.9 fL Final    MCH 01/20/2023 32.3  25.2 - 33.5 pg Final    MCHC 01/20/2023 34.5  28.4 - 34.8 g/dL Final    RDW 01/20/2023 13.7  11.8 - 14.4 % Final    Platelets 71/16/1185 347  138 - 453 k/uL Final    MPV 01/20/2023 11.1  8.1 - 13.5 fL Final    NRBC Automated 01/20/2023 0.0  0.0 per 100 WBC Final    Seg Neutrophils 01/20/2023 52  36 - 65 % Final    Lymphocytes 01/20/2023 40  24 - 43 % Final    Monocytes 01/20/2023 6  3 - 12 % Final    Eosinophils % 01/20/2023 2  1 - 4 % Final    Basophils 01/20/2023 0  0 - 2 % Final    Immature Granulocytes 01/20/2023 0  0 % Final    Segs Absolute 01/20/2023 4.20  1.50 - 8.10 k/uL Final    Absolute Lymph # 01/20/2023 3.30  1.10 - 3.70 k/uL Final    Absolute Mono # 01/20/2023 0.49  0.10 - 1.20 k/uL Final    Absolute Eos # 01/20/2023 0.17  0.00 - 0.44 k/uL Final    Basophils Absolute 01/20/2023 0.03  0.00 - 0.20 k/uL Final    Absolute Immature Granulocyte 01/20/2023 <0.03  0.00 - 0.30 k/uL Final    Color, UA 01/20/2023 Yellow  Yellow Final    Turbidity UA 01/20/2023 Clear  Clear Final    Glucose, Ur 01/20/2023 NEGATIVE  NEGATIVE Final    Bilirubin Urine 01/20/2023 NEGATIVE  NEGATIVE Final    Ketones, Urine 01/20/2023 NEGATIVE  NEGATIVE Final    Specific Gravity, UA 01/20/2023 1.010  1.010 - 1.020 Final    Urine Hgb 01/20/2023 NEGATIVE  NEGATIVE Final    pH, UA 01/20/2023 7.0  5.0 - 9.0 Final    Protein, UA 01/20/2023 NEGATIVE  NEGATIVE Final    Urobilinogen, Urine 01/20/2023 Normal  Normal Final    Nitrite, Urine 01/20/2023 NEGATIVE  NEGATIVE Final    Leukocyte Esterase, Urine 01/20/2023 NEGATIVE  NEGATIVE Final    Ethanol 01/20/2023 <10  <10 mg/dL Final    Ethanol percent 01/20/2023 <0.010  <0.010 % Final    Amphetamine Screen, Ur 01/20/2023 POSITIVE (A)  NEGATIVE Final    Comment:       (Positive cutoff 1000 ng/mL)                  Barbiturate Screen, Ur 01/20/2023 NEGATIVE  NEGATIVE Final    Comment:       (Positive cutoff 200 ng/mL)                  Benzodiazepine Screen, Urine 01/20/2023 NEGATIVE  NEGATIVE Final    Comment:       (Positive cutoff 200 ng/mL)                  Cocaine Metabolite, Urine 01/20/2023 NEGATIVE  NEGATIVE Final    Comment:       (Positive cutoff 300 ng/mL)                  Methadone Screen, Urine 01/20/2023 NEGATIVE  NEGATIVE Final    Comment:       (Positive cutoff 300 ng/mL)                  Opiates, Urine 01/20/2023 NEGATIVE  NEGATIVE Final    Comment:       (Positive cutoff 300 ng/mL)                  Phencyclidine, Urine 01/20/2023 NEGATIVE  NEGATIVE Final    Comment:       (Positive cutoff 25 ng/mL)                  Cannabinoid Scrn, Ur 01/20/2023 NEGATIVE  NEGATIVE Final    Comment:       (Positive cutoff 50 ng/mL)                  Oxycodone Screen, Ur 01/20/2023 NEGATIVE  NEGATIVE Final    Comment:       (Positive cutoff 100 ng/mL)                  Fentanyl, Ur 01/20/2023 NEGATIVE  NEGATIVE Final    Comment:       (Positive cutoff  5 ng/ml)            Buprenorphine Urine 01/20/2023 NEGATIVE  NEGATIVE Final    Comment:       (Positive cutoff  5 ng/ml)                 Reviewed patient's current plan of care and vital signs with nursing staff. Labs reviewed: [x] Yes  Last EKG in EMR reviewed: [x] Yes  7/11/2022     Medications  Current Facility-Administered Medications: nicotine polacrilex (NICORETTE) gum 2 mg, 2 mg, Oral, Q2H PRN  gabapentin (NEURONTIN) capsule 200 mg, 200 mg, Oral, TID  risperiDONE (RISPERDAL M-TABS) disintegrating tablet 1 mg, 1 mg, Oral, BID  ibuprofen (ADVIL;MOTRIN) tablet 400 mg, 400 mg, Oral, Q6H PRN  polyethylene glycol (GLYCOLAX) packet 17 g, 17 g, Oral, Daily PRN  aluminum & magnesium hydroxide-simethicone (MAALOX) 200-200-20 MG/5ML suspension 30 mL, 30 mL, Oral, Q6H PRN  hydrOXYzine HCl (ATARAX) tablet 50 mg, 50 mg, Oral, TID PRN  traZODone (DESYREL) tablet 50 mg, 50 mg, Oral, Nightly PRN  hydrocortisone 1 % cream, , Topical, BID    ASSESSMENT  Bipolar I disorder, most recent episode mixed, severe with psychotic features ([facility] [address]         PLAN  Patient symptoms are: Unstable  Medications Changed Today. A discussion of risks, benefits, and alternatives was held with the patient and this provider with regards to medication changes.   After this discussion we mutually agreed to proceed with the medication changes. Titrate Risperdal to 1 mg p.o. twice daily   Taper Neurontin 200 mg p.o. 3 times daily with plan to discontinue  Monitor need and frequency of PRN medications. Encourage participation in groups and milieu. Attempt to develop insight. Psycho-education conducted. Probable discharge is to be determined  Follow-up daily while inpatient. Patient continues to be monitored in the inpatient psychiatric facility at [facility] for safety and stabilization. Patient continues to need, on a daily basis, active treatment furnished directly by or requiring the supervision of inpatient psychiatric personnel. Electronically signed by MAURICE Fenton CNP on 1/22/2023 at 5:48 PM    **This report has been created using voice recognition software. It may contain minor errors which are inherent in voice recognition technology. **

## 2023-01-22 NOTE — PLAN OF CARE
Problem: Depression  Goal: Will be euthymic at discharge  Description: INTERVENTIONS:  1. Administer medication as ordered  2. Provide emotional support via 1:1 interaction with staff  3. Encourage involvement in milieu/groups/activities  4. Monitor for social isolation  1/22/2023 1155 by Akosua Hoyt LPN  Outcome: Progressing, Patient voiced depression 3 out 10; stated she's using her coping skills to help with her depression. Patient encouraged to continue her  mediation regimen. 100 % meal and fluid intake. Problem: Genna  Goal: Will exhibit normal sleep and speech and no impulsivity  Description: INTERVENTIONS:  1. Administer medication as ordered  2. Set limits on impulsive behavior  3. Make attempts to decrease external stimuli as possible  Outcome: Progressing, Patient remain hyper verbal related to her husbands behaviors; not caring for her. Problem: Anxiety  Goal: Will report anxiety at manageable levels  Description: INTERVENTIONS:  1. Administer medication as ordered  2. Teach and rehearse alternative coping skills  3. Provide emotional support with 1:1 interaction with staff  1/22/2023 1155 by Akosua Hoyt LPN  Outcome: Progressing,, Patient anxious and irritable related to her  not caring. Patient offered then given Atarax.

## 2023-01-22 NOTE — BH NOTE
Patient anxious and observed crying related  hasn't cared for an entire year. Atarax given as ordered.

## 2023-01-22 NOTE — PROGRESS NOTES
Nutrition Assessment     Type and Reason for Visit: Positive Nutrition Screen, Consult (56# weight loss since June 2022)    Nutrition Recommendations/Plan:   Continue diet as ordered. Discontinue supplement since pt isn't drinking nor does nurse feel she needs supplements. Malnutrition Assessment:  Malnutrition Status: No malnutrition    Nutrition Assessment:  Yesterday writer added Ensure Plus at meals, RN reports she gave it away saying she doesn't like that stuff. RN indicated even if pt lost some weight she doesn't appear underweight or malnourished and is eating. Current Nutrition Therapies:    ADULT DIET;  Regular    Anthropometric Measures:  Height: 5' 5\" (165.1 cm)  Current Body Wt: 132 lb (59.9 kg)   BMI: 22    Nutrition Diagnosis:   No nutrition diagnosis at this time     Nutrition Interventions:   Food and/or Nutrient Delivery: Continue Current Diet, Discontinue Oral Nutrition Supplement  Nutrition Education/Counseling: No recommendation at this time  Coordination of Nutrition Care: Continue to monitor while inpatient       Goals:     Goals: PO intake 75% or greater       Nutrition Monitoring and Evaluation:              Discharge Planning:    Continue current diet     Marisol Gillette, [address] [place] [address] [facility]

## 2023-01-22 NOTE — PLAN OF CARE
Problem: Depression  Goal: Will be euthymic at discharge  Description: INTERVENTIONS:  1. Administer medication as ordered  2. Provide emotional support via 1:1 interaction with staff  3. Encourage involvement in milieu/groups/activities  4. Monitor for social isolation  1/21/2023 2349 by Tolu Carrington LPN  Outcome: Progressing     Problem: Anxiety  Goal: Will report anxiety at manageable levels  Description: INTERVENTIONS:  1. Administer medication as ordered  2. Teach and rehearse alternative coping skills  3. Provide emotional support with 1:1 interaction with staff  1/21/2023 2349 by Tolu Carrington LPN  Outcome: Progressing  Flowsheets (Taken 1/21/2023 2342)  Will report anxiety at manageable levels:   Administer medication as ordered   Teach and rehearse alternative coping skills     Patient denies any thoughts or plans to harm herself or anyone else. She has remained isolative to her room and is out for needs only. She yelled \"what\" to writer when asked to report to nurses station to take scheduled medication and became irritable and annoyed when asked questions during assessment. Q 15 minute checks are in place to maintain safety. I will continue to monitor and provide for a safe and therapeutic environment.

## 2023-01-22 NOTE — GROUP NOTE
Group Therapy Note    Date: 1/22/2023    Group Start Time: 0900  Group End Time: 0915  Group Topic: Community Meeting    [address] [facility] [address]    [address] [address] Meeting Group Note        Date: January 22, 2023 Start Time: 9am  End Time: 10am      Number of Participants in Group & Unit Census:  14/23    Topic: Goals Group    Goal of Group: To establish a short-term goal for the day. Comments:     Patient did not participate in [employer] group, despite staff encouragement and explanation of benefits. Patient remain seclusive to self. Q15 minute safety checks maintained for patient safety and will continue to encourage patient to attend unit programming.        Group Therapy Note    Attendees: 14/23

## 2023-01-23 LAB
ESTIMATED AVERAGE GLUCOSE: 94 MG/DL
HBA1C MFR BLD: 4.9 % (ref 4–6)

## 2023-01-23 PROCEDURE — 6370000000 HC RX 637 (ALT 250 FOR IP): Performed by: NURSE PRACTITIONER

## 2023-01-23 PROCEDURE — 6370000000 HC RX 637 (ALT 250 FOR IP)

## 2023-01-23 PROCEDURE — 1240000000 HC EMOTIONAL WELLNESS R&B

## 2023-01-23 PROCEDURE — 6370000000 HC RX 637 (ALT 250 FOR IP): Performed by: PSYCHIATRY & NEUROLOGY

## 2023-01-23 PROCEDURE — 99232 SBSQ HOSP IP/OBS MODERATE 35: CPT | Performed by: NURSE PRACTITIONER

## 2023-01-23 PROCEDURE — 6370000000 HC RX 637 (ALT 250 FOR IP): Performed by: INTERNAL MEDICINE

## 2023-01-23 RX ADMIN — TRAZODONE HYDROCHLORIDE 50 MG: 50 TABLET ORAL at 22:03

## 2023-01-23 RX ADMIN — NICOTINE POLACRILEX 2 MG: 2 GUM, CHEWING BUCCAL at 09:03

## 2023-01-23 RX ADMIN — GABAPENTIN 200 MG: 100 CAPSULE ORAL at 22:03

## 2023-01-23 RX ADMIN — HYDROCORTISONE: 1 CREAM TOPICAL at 08:42

## 2023-01-23 RX ADMIN — RISPERIDONE 1 MG: 1 TABLET, ORALLY DISINTEGRATING ORAL at 22:03

## 2023-01-23 RX ADMIN — GABAPENTIN 200 MG: 100 CAPSULE ORAL at 08:43

## 2023-01-23 RX ADMIN — GABAPENTIN 200 MG: 100 CAPSULE ORAL at 14:22

## 2023-01-23 RX ADMIN — HYDROXYZINE HYDROCHLORIDE 50 MG: 50 TABLET, FILM COATED ORAL at 22:03

## 2023-01-23 RX ADMIN — RISPERIDONE 1 MG: 1 TABLET, ORALLY DISINTEGRATING ORAL at 08:43

## 2023-01-23 RX ADMIN — IBUPROFEN 400 MG: 400 TABLET, FILM COATED ORAL at 22:03

## 2023-01-23 ASSESSMENT — LIFESTYLE VARIABLES
HOW MANY STANDARD DRINKS CONTAINING ALCOHOL DO YOU HAVE ON A TYPICAL DAY: PATIENT DOES NOT DRINK
HOW OFTEN DO YOU HAVE A DRINK CONTAINING ALCOHOL: NEVER

## 2023-01-23 NOTE — GROUP NOTE
Group Therapy Note    Date: 1/22/2023    Group Start Time: 2000  Group End Time: 2035  Group Topic: Wrap-Up    PEEWEE George        Group Therapy Note    Attendees: 13/22         Status After Intervention:  Improved    Participation Level:  Active Listener    Participation Quality: Appropriate      Speech:  normal      Thought Process/Content: Logical      Affective Functioning: Congruent      Mood: elevated      Level of consciousness:  Alert      Response to Learning: Able to verbalize current knowledge/experience      Endings: None Reported    Modes of Intervention: Socialization      Discipline Responsible: Behavorial [employer]      Signature:  Radha Humphries
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
## 2023-01-23 NOTE — PROGRESS NOTES
Behavioral Services  Medicare Certification Upon Admission    I certify that this patient's inpatient psychiatric hospital admission is medically necessary for:    [x] (1) Treatment which could reasonably be expected to improve this patient's condition,       [x] (2) Or for diagnostic study;     AND     [x](2) The inpatient psychiatric services are provided while the individual is under the care of a physician and are included in the individualized plan of care.     Estimated length of stay/service 4-7 days    Plan for post-hospital care home with outpatient [facility] f/u    Electronically signed by Geovany Foster MD on 1/23/2023 at 7:44 AM

## 2023-01-23 NOTE — PLAN OF CARE
[address] [place] [address]  Day 3 Interdisciplinary Treatment Plan NOTE    Review Date & Time: 1/23/23 1300    Admission Type:   Admission Type: Involuntary    Reason for admission:  Reason for Admission: Patient states that she has been overwhelmed due to divorce, anniversary of her sons death, and recent surgeries. Patient states she has never said she had suicidal ideations. Patient had mental health court papers with notes stating patient has been demonstrating paranoia and aggression behavior.   Estimated Length of Stay:  5-7 days  Estimated Discharge Date Update:   to be determined by physician    PATIENT STRENGTHS:  Patient Strengths:   Patient Strengths and Limitations:Limitations: Unrealistic self-view, Difficulty problem solving/relies on others to help solve problems, Difficult relationships / poor social skills  Addictive Behavior:Addictive Behavior  In the Past 3 Months, Have You Felt or Has Someone Told You That You Have a Problem With  : None  Medical Problems:  Past Medical History:   Diagnosis Date    Allergic rhinitis     Anxiety     Arthritis     GERD (gastroesophageal reflux disease)     Impaired fasting glucose     Ligament tear     Rt shoulder [facility] injections    Pure hyperglyceridemia        Risk:  Fall Risk   Frederic Scale Frederic Scale Score: 22  BVC    Change in scores:  No Changes to plan of Care:  No    Status EXAM:   Mental Status and Behavioral Exam  Normal: No  Level of Assistance: Independent/Self  Facial Expression: Exaggerated  Affect: Unstable  Level of Consciousness: Alert  Frequency of Checks: 4 times per hour, close  Mood:Normal: No  Mood: Anxious, Labile, Depressed  Motor Activity:Normal: Yes  Eye Contact: Good  Observed Behavior: Cooperative, Preoccupied, Agitated  Sexual Misconduct History: Current - no  Preception: [place] to person, [place] to time, [place] to place, [place] to situation  Attention:Normal: No  Attention: Distractible, Unable to concentrate  Thought Processes: Loose association, Flight of ideas, Tangential  Thought Content:Normal: No  Thought Content: Preoccupations  Depression Symptoms: Impaired concentration, Feelings of hopelessess, Feelings of helplessness  Anxiety Symptoms: Generalized  Genna Symptoms: Poor judgment, Flight of ideas, Pressured speech  Hallucinations: None  Delusions: No  Memory:Normal: No  Memory: Poor recent, Poor remote  Insight and Judgment: No  Insight and Judgment: Poor judgment, Poor insight    Daily Assessment Last Entry:   Daily Sleep (WDL): Within Defined Limits            Daily Nutrition (WDL): Within Defined Limits  Level of Assistance: Independent/Self    Patient Monitoring:  Frequency of Checks: 4 times per hour, close    Psychiatric Symptoms:   Depression Symptoms  Depression Symptoms: Impaired concentration, Feelings of hopelessess, Feelings of helplessness  Anxiety Symptoms  Anxiety Symptoms: Generalized  Genna Symptoms  Genna Symptoms: Poor judgment, Flight of ideas, Pressured speech          Suicide Risk CSSR-S:  1) Within the past month, have you wished you were dead or wished you could go to sleep and not wake up? : No  2) Have you actually had any thoughts of killing yourself? : No  6) Have you ever done anything, started to do anything, or prepared to do anything to end your life?: No  Change in Result:  none Change in Plan of care:  none      EDUCATION:   Learner Progress Toward Treatment Goals:   Reviewed results and recommendations of this team, Reviewed group plan and strategies, Reviewed signs, symptoms and risk of self harm and violent behavior, Reviewed goals and plan of care    Method:  small group, individual verbal education    Outcome:   Verbalized by patient but needs reinforcement to obtain goals    PATIENT GOALS:  Short term:  discharge planning, link with outpatient services  Long term:  separate from spouse, prioritize own needs    PLAN/TREATMENT RECOMMENDATIONS UPDATE:  continue with group therapies, increased socialization, continue planning for after discharge goals, continue with medication compliance    SHORT-TERM GOALS UPDATE:   Time frame for Short-Term Goals:  5-7 days    LONG-TERM GOALS UPDATE:   Time frame for Long-Term Goals:  6 months    Members Present in Team Meeting:   See signature sheet  Dominique Benoit RN

## 2023-01-23 NOTE — PLAN OF CARE
Problem: Depression  Goal: Will be euthymic at discharge  Description: INTERVENTIONS:  1. Administer medication as ordered  2. Provide emotional support via 1:1 interaction with staff  3. Encourage involvement in milieu/groups/activities  4. Monitor for social isolation  Outcome: Progressing, Patient denied depression ,voiced ready for discharge. Patient encouraged to seek staff for 1:1 talk time. Problem: Genna  Goal: Will exhibit normal sleep and speech and no impulsivity  Description: INTERVENTIONS:  1. Administer medication as ordered  2. Set limits on impulsive behavior  3. Make attempts to decrease external stimuli as possible  Outcome: Progressing, Patient hyper verbal, loud and irritable without apparent reason. Patient has been medication complaint. Problem: Anxiety  Goal: Will report anxiety at manageable levels  Description: INTERVENTIONS:  1. Administer medication as ordered  2. Teach and rehearse alternative coping skills  3. Provide emotional support with 1:1 interaction with staff  Outcome: Progressing, Patient behavior very anxious especially after she herself mentions her  name; encouraged to continue her medications.
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
## 2023-01-23 NOTE — GROUP NOTE
Group Therapy Note    Date: 1/23/2023    Group Start Time: 1330  Group End Time: 1517  Group Topic: Psychoeducation    BREANN Gonzalez    Group Therapy Note    Attendees: 11     Patient's Goal:  Patient will demonstrate improved interpersonal skills    Notes:  Patient attended group and participated.     Status After Intervention:  Improved    Participation Level: Interactive    Participation Quality: Appropriate, Attentive, and Sharing      Speech:  normal      Thought Process/Content: Logical  Linear      Affective Functioning: Congruent      Mood: euthymic      Level of consciousness:  Alert, Oriented x4, and Attentive      Response to Learning: Able to verbalize current knowledge/experience, Able to verbalize/acknowledge new learning, Able to retain information, and Progressing to goal      Endings: None Reported    Modes of Intervention: Education, Support, Socialization, and Exploration      Discipline Responsible: Psychoeducational Specialist      Signature:  Ritesh Elizabeth [place]

## 2023-01-23 NOTE — GROUP NOTE
Group Therapy Note    Date: 1/23/2023    Group Start Time: 0900  Group End Time: 0930  Group Topic: Focus Group    CZ ALLISONI PAULA    Patti France LPN        Group Therapy Note    Attendees: 11/23       Patient's Goal:   Goal setting. Notes:   Patient verbalize understanding of  setting goal.    Status After Intervention:  Improved    Participation Level:  Active Listener and Interactive    Participation Quality: Appropriate, Sharing, and Supportive      Speech:  normal      Thought Process/Content: Logical      Affective Functioning: Congruent      Mood: anxious      Level of consciousness:  Oriented x4      Response to Learning: Able to verbalize/acknowledge new learning      Endings: None Reported    Modes of Intervention: Education and Support      Discipline Responsible: Licensed Practical Nurse      Signature:  Patti France LPN

## 2023-01-23 NOTE — GROUP NOTE
Group Therapy Note    Date: 1/23/2023    Group Start Time: 1100  Group End Time: 6007  Group Topic: Psychoeducation    PEEWEE BHAYDIN MasonS    Group Therapy Note    Attendees: 12     Patient's Goal:  Patient will identify benefits of leisure for coping and stress management    Notes:  Patient attended group and participated. Status After Intervention:  Improved    Participation Level:  Active Listener and Interactive    Participation Quality: Appropriate and Attentive      Speech:  normal      Thought Process/Content: Logical  Linear      Affective Functioning: Congruent      Mood: euthymic      Level of consciousness:  Alert, Oriented x4, and Attentive      Response to Learning: Able to verbalize current knowledge/experience, Able to verbalize/acknowledge new learning, and Able to retain information      Endings: None Reported    Modes of Intervention: Education, Support, Socialization, and Exploration      Discipline Responsible: Psychoeducational Specialist      Signature:  Vanessa Zhou, [address]

## 2023-01-24 PROCEDURE — 99232 SBSQ HOSP IP/OBS MODERATE 35: CPT | Performed by: NURSE PRACTITIONER

## 2023-01-24 PROCEDURE — 1240000000 HC EMOTIONAL WELLNESS R&B

## 2023-01-24 PROCEDURE — 6370000000 HC RX 637 (ALT 250 FOR IP)

## 2023-01-24 PROCEDURE — 6370000000 HC RX 637 (ALT 250 FOR IP): Performed by: PSYCHIATRY & NEUROLOGY

## 2023-01-24 RX ORDER — HALOPERIDOL 5 MG/1
5 TABLET ORAL EVERY 6 HOURS PRN
Status: DISCONTINUED | OUTPATIENT
Start: 2023-01-24 | End: 2023-01-26 | Stop reason: HOSPADM

## 2023-01-24 RX ORDER — LORAZEPAM 2 MG/ML
2 INJECTION INTRAMUSCULAR EVERY 6 HOURS PRN
Status: DISCONTINUED | OUTPATIENT
Start: 2023-01-24 | End: 2023-01-26 | Stop reason: HOSPADM

## 2023-01-24 RX ORDER — HALOPERIDOL 5 MG/ML
5 INJECTION INTRAMUSCULAR EVERY 6 HOURS PRN
Status: DISCONTINUED | OUTPATIENT
Start: 2023-01-24 | End: 2023-01-26 | Stop reason: HOSPADM

## 2023-01-24 RX ORDER — LORAZEPAM 1 MG/1
2 TABLET ORAL EVERY 6 HOURS PRN
Status: DISCONTINUED | OUTPATIENT
Start: 2023-01-24 | End: 2023-01-26 | Stop reason: HOSPADM

## 2023-01-24 RX ORDER — DIPHENHYDRAMINE HYDROCHLORIDE 50 MG/ML
50 INJECTION INTRAMUSCULAR; INTRAVENOUS EVERY 6 HOURS PRN
Status: DISCONTINUED | OUTPATIENT
Start: 2023-01-24 | End: 2023-01-26 | Stop reason: HOSPADM

## 2023-01-24 RX ADMIN — GABAPENTIN 200 MG: 100 CAPSULE ORAL at 21:02

## 2023-01-24 RX ADMIN — NICOTINE POLACRILEX 2 MG: 2 GUM, CHEWING BUCCAL at 17:02

## 2023-01-24 RX ADMIN — RISPERIDONE 1 MG: 1 TABLET, ORALLY DISINTEGRATING ORAL at 09:52

## 2023-01-24 RX ADMIN — HYDROCORTISONE: 1 CREAM TOPICAL at 21:02

## 2023-01-24 RX ADMIN — GABAPENTIN 200 MG: 100 CAPSULE ORAL at 09:52

## 2023-01-24 RX ADMIN — RISPERIDONE 1 MG: 1 TABLET, ORALLY DISINTEGRATING ORAL at 21:02

## 2023-01-24 RX ADMIN — GABAPENTIN 200 MG: 100 CAPSULE ORAL at 15:52

## 2023-01-24 ASSESSMENT — PAIN SCALES - GENERAL: PAINLEVEL_OUTOF10: 0

## 2023-01-24 NOTE — PROGRESS NOTES
Daily Progress Note  1/24/2023    Patient Name: Jennifer Melvin:  Paranoia and verbally aggressive behavior          SUBJECTIVE:      Patient is seen today for a follow up assessment. Staff reports that patient was extremely irritable this morning, triggered by her . She is making verbal threats over the phone and telling him that she is being discharged erroneously. Emergency medications were ordered, however she was redirectable and able to maintain behavioral control without receiving them. Throughout the day her irritability subsided, she was more reasonable this afternoon. She continues to verbalize frustration though acknowledges that she was not controlling her temper well. She continues to have difficulty sleeping. She reports that it is difficult working with providers other than her longtime outpatient psychiatric provider. She has yet to show a full day with stability in symptoms however this afternoon was the most reasonable she has been regarding her plan of care.     Appetite:  [x] Adequate/Unchanged  [] Increased  [] Decreased      Sleep:       [] Adequate/Unchanged  [] Fair  [x] Poor      Group Attendance on Unit:   [] Yes   [] Selectively   []  No    Compliant with scheduled medications: [x] Yes  [] No    Received emergency medications in past 24 hrs: [] Yes   [x] No    Medication Side Effects: Denies         Mental Status Exam  Level of consciousness: Awake and alert  Appearance:  Appropriate attire, standing at nurses station, fair grooming  Behavior/Motor: More approachable, engages with interviewer  Attitude toward examiner: Cooperative, less interruptive, improved eye contact  Speech: Normal rate, volume and less irritable tone  Mood: \"I am better\"  Affect: Congruent, less agitated and frustrated  Thought processes: More linear and coherent  Thought content: Denies suicidal ideations, without current plan or intent, unable to contracts for safety off the unit.               Denies homicidal ideations               Denies hallucinations              Denies delusions              Paranoia improving  Cognition:  Oriented to self, location, orientation to general circumstance improving  concentration: Fair  Memory: Impaired  Insight &Judgment: Poor though improving    Data   height is 5' 5\" (1.651 m) and weight is 132 lb 12.8 oz (60.2 kg). Her temporal temperature is 97.1 °F (36.2 °C). Her blood pressure is 119/74 and her pulse is 129 (abnormal). Her respiration is 16. Labs:   Admission on 01/21/2023   Component Date Value Ref Range Status    Cholesterol 01/22/2023 196  <200 mg/dL Final    Comment:    Cholesterol Guidelines:      <200  Desirable   200-240  Borderline      >240  Undesirable         HDL 01/22/2023 52  >40 mg/dL Final    Comment:    HDL Guidelines:    <40     Undesirable   40-59    Borderline    >59     Desirable         LDL Cholesterol 01/22/2023 121  0 - 130 mg/dL Final    Comment:    LDL Guidelines:     <100    Desirable   100-129   Near to/above Desirable   130-159   Borderline      >159   Undesirable     Direct (measured) LDL and calculated LDL are not interchangeable tests. Chol/HDL Ratio 01/22/2023 3.8  <5 Final            Triglycerides 01/22/2023 115  <150 mg/dL Final    Comment:    Triglyceride Guidelines:     <150   Desirable   150-199  Borderline   200-499  High     >499   Very high   Based on AHA Guidelines for fasting triglyceride, October 2012. Hemoglobin A1C 01/22/2023 4.9  4.0 - 6.0 % Final    Estimated Avg Glucose 01/22/2023 94  mg/dL Final    Comment: The ADA and AACC recommend providing the estimated average glucose result to permit better   patient understanding of their HBA1c result. TSH 01/22/2023 0.86  0.30 - 5.00 uIU/mL Final         Reviewed patient's current plan of care and vital signs with nursing staff.     Labs reviewed: [x] Yes  Last EKG in EMR reviewed: [x] Yes  7/11/2022     Medications  Current Facility-Administered Medications: haloperidol (HALDOL) tablet 5 mg, 5 mg, Oral, Q6H PRN **AND** LORazepam (ATIVAN) tablet 2 mg, 2 mg, Oral, Q6H PRN  haloperidol lactate (HALDOL) injection 5 mg, 5 mg, IntraMUSCular, Q6H PRN **AND** LORazepam (ATIVAN) injection 2 mg, 2 mg, IntraMUSCular, Q6H PRN **AND** diphenhydrAMINE (BENADRYL) injection 50 mg, 50 mg, IntraMUSCular, Q6H PRN  nicotine polacrilex (NICORETTE) gum 2 mg, 2 mg, Oral, Q2H PRN  gabapentin (NEURONTIN) capsule 200 mg, 200 mg, Oral, TID  risperiDONE (RISPERDAL M-TABS) disintegrating tablet 1 mg, 1 mg, Oral, BID  ibuprofen (ADVIL;MOTRIN) tablet 400 mg, 400 mg, Oral, Q6H PRN  polyethylene glycol (GLYCOLAX) packet 17 g, 17 g, Oral, Daily PRN  aluminum & magnesium hydroxide-simethicone (MAALOX) 200-200-20 MG/5ML suspension 30 mL, 30 mL, Oral, Q6H PRN  hydrOXYzine HCl (ATARAX) tablet 50 mg, 50 mg, Oral, TID PRN  traZODone (DESYREL) tablet 50 mg, 50 mg, Oral, Nightly PRN  hydrocortisone 1 % cream, , Topical, BID    ASSESSMENT  Bipolar I disorder, most recent episode mixed, severe with psychotic features ([facility] [address]         PLAN  Patient symptoms are: Slowly improving  No new orders, continue current medication regimen and observe   Encourage oral medication compliance  Monitor need and frequency of PRN medications. Encourage oral intake  Encourage participation in groups and milieu. Attempt to develop insight. Psycho-education conducted. Probable discharge is likely in the next 2 to 3 days with continued stability in symptoms  Follow-up daily while inpatient. Patient continues to be monitored in the inpatient psychiatric facility at [facility] for safety and stabilization. Patient continues to need, on a daily basis, active treatment furnished directly by or requiring the supervision of inpatient psychiatric personnel. Electronically signed by MAURICE Moore CNP on 1/24/2023 at 6:25 PM    **This report has been created using voice recognition software.  It may contain minor errors which are inherent in voice recognition technology. **

## 2023-01-24 NOTE — GROUP NOTE
Psych-Ed/Relapse Prevention Group Note        Date: January 23, 2023 Start Time: 1:30pm  End Time:  2:15pm      Number of Participants in Group & Unit Census:  14/21    Topic: Coping skills     Goal of Group:Patient will identify benefits of music for coping and stress management      Comments:     Patient did not participate in Psych-Ed/Relapse Prevention group, despite staff encouragement and explanation of benefits. Patient remain seclusive to self. Q15 minute safety checks maintained for patient safety and will continue to encourage patient to attend unit programming.        Signature:  Jeremy Fox, [address]

## 2023-01-24 NOTE — BH NOTE
This patient is calling Aide Flood [phone] ( ) and is making threats and telling this person she is being discharged. Writer offered patient to sign HERNANDO for  and patient refused.

## 2023-01-24 NOTE — PLAN OF CARE
Problem: Anxiety  Goal: Will report anxiety at manageable levels  Description: INTERVENTIONS:  1. Administer medication as ordered  2. Teach and rehearse alternative coping skills  3. Provide emotional support with 1:1 interaction with staff  1/24/2023 0042 by Olinda Franklin LPN  Outcome: Progressing  Flowsheets (Taken 1/24/2023 0023)  Will report anxiety at manageable levels:   Administer medication as ordered   Teach and rehearse alternative coping skills     Patient denies any thoughts or plans to harm herself or anyone else. She is focused mainly on discharge as well as our care team working directly with her psych doctor. Patient became hyper verbal and irrational when demands where not being met. After being reeducated on protocol and being compliant with said care plan patient was still frustrated yet was able to be easily redirected to comply with medication orders as well as within a controlled behavior that provided for a safe and therapeutic environment. Q 15 minute checks are being done to maintain safety. I will continue to monitor and provide for a safe and therapeutic environment.

## 2023-01-24 NOTE — PLAN OF CARE
Problem: Depression  Goal: Will be euthymic at discharge  Description: INTERVENTIONS:  1. Administer medication as ordered  2. Provide emotional support via 1:1 interaction with staff  3. Encourage involvement in milieu/groups/activities  4. Monitor for social isolation  1/24/2023 1026 by Hipolito Decker LPN  Outcome: Progressing  1/24/2023 0042 by Lauro Bragg LPN  Outcome: Progressing     Problem: Genna  Goal: Will exhibit normal sleep and speech and no impulsivity  Description: INTERVENTIONS:  1. Administer medication as ordered  2. Set limits on impulsive behavior  3. Make attempts to decrease external stimuli as possible  Outcome: Progressing     Problem: Anxiety  Goal: Will report anxiety at manageable levels  Description: INTERVENTIONS:  1. Administer medication as ordered  2. Teach and rehearse alternative coping skills  3. Provide emotional support with 1:1 interaction with staff  1/24/2023 1026 by Hipolito Decker LPN  Outcome: Progressing  1/24/2023 0042 by Lauro Bragg LPN  Outcome: Progressing  Flowsheets (Taken 1/24/2023 0023)  Will report anxiety at manageable levels:   Administer medication as ordered   Teach and rehearse alternative coping skills     Patient refused assessments and vitals. She is easily agitated and irritable, liable, flight of ideas, bizarre, non compliant, refused medication and then agreed to take them hours later, entitled, tearful ,demanding , and at times redirectable.     Problem: Pain  Goal: Verbalizes/displays adequate comfort level or baseline comfort level  Outcome: Progressing

## 2023-01-24 NOTE — GROUP NOTE
Psych-Ed/Relapse Prevention Group Note        Date: January 24, 2023 Start Time: 11am  End Time: 11:45am      Number of Participants in Group & Unit Census:  11/22    Topic: Socialization    Goal of Group:Patient will demonstrate improved interpersonal skills and participate in group sharing activity      Comments:     Patient did not participate in Psych-Ed/Relapse Prevention group, despite staff encouragement and explanation of benefits. Patient remain seclusive to self. Q15 minute safety checks maintained for patient safety and will continue to encourage patient to attend unit programming.          Signature:  Shadia Mcgraw, [address]

## 2023-01-25 PROCEDURE — 99232 SBSQ HOSP IP/OBS MODERATE 35: CPT

## 2023-01-25 PROCEDURE — 6370000000 HC RX 637 (ALT 250 FOR IP): Performed by: NURSE PRACTITIONER

## 2023-01-25 PROCEDURE — 1240000000 HC EMOTIONAL WELLNESS R&B

## 2023-01-25 PROCEDURE — 6370000000 HC RX 637 (ALT 250 FOR IP): Performed by: PSYCHIATRY & NEUROLOGY

## 2023-01-25 PROCEDURE — 6370000000 HC RX 637 (ALT 250 FOR IP)

## 2023-01-25 RX ADMIN — RISPERIDONE 1 MG: 1 TABLET, ORALLY DISINTEGRATING ORAL at 21:19

## 2023-01-25 RX ADMIN — HYDROCORTISONE: 1 CREAM TOPICAL at 08:06

## 2023-01-25 RX ADMIN — NICOTINE POLACRILEX 2 MG: 2 GUM, CHEWING BUCCAL at 13:11

## 2023-01-25 RX ADMIN — GABAPENTIN 200 MG: 100 CAPSULE ORAL at 21:19

## 2023-01-25 RX ADMIN — NICOTINE POLACRILEX 2 MG: 2 GUM, CHEWING BUCCAL at 08:53

## 2023-01-25 RX ADMIN — IBUPROFEN 400 MG: 400 TABLET, FILM COATED ORAL at 13:11

## 2023-01-25 RX ADMIN — NICOTINE POLACRILEX 2 MG: 2 GUM, CHEWING BUCCAL at 18:33

## 2023-01-25 RX ADMIN — NICOTINE POLACRILEX 2 MG: 2 GUM, CHEWING BUCCAL at 15:16

## 2023-01-25 RX ADMIN — TRAZODONE HYDROCHLORIDE 50 MG: 50 TABLET ORAL at 21:19

## 2023-01-25 RX ADMIN — NICOTINE POLACRILEX 2 MG: 2 GUM, CHEWING BUCCAL at 20:12

## 2023-01-25 RX ADMIN — NICOTINE POLACRILEX 2 MG: 2 GUM, CHEWING BUCCAL at 21:22

## 2023-01-25 RX ADMIN — HYDROCORTISONE: 1 CREAM TOPICAL at 21:22

## 2023-01-25 RX ADMIN — GABAPENTIN 200 MG: 100 CAPSULE ORAL at 08:03

## 2023-01-25 RX ADMIN — GABAPENTIN 200 MG: 100 CAPSULE ORAL at 13:11

## 2023-01-25 RX ADMIN — HYDROXYZINE HYDROCHLORIDE 50 MG: 50 TABLET, FILM COATED ORAL at 21:19

## 2023-01-25 RX ADMIN — RISPERIDONE 1 MG: 1 TABLET, ORALLY DISINTEGRATING ORAL at 08:06

## 2023-01-25 ASSESSMENT — PAIN DESCRIPTION - LOCATION: LOCATION: SHOULDER

## 2023-01-25 ASSESSMENT — PAIN DESCRIPTION - ORIENTATION: ORIENTATION: RIGHT

## 2023-01-25 ASSESSMENT — PAIN DESCRIPTION - DESCRIPTORS: DESCRIPTORS: ACHING

## 2023-01-25 ASSESSMENT — PAIN - FUNCTIONAL ASSESSMENT: PAIN_FUNCTIONAL_ASSESSMENT: ACTIVITIES ARE NOT PREVENTED

## 2023-01-25 ASSESSMENT — PAIN SCALES - GENERAL: PAINLEVEL_OUTOF10: 4

## 2023-01-25 NOTE — PROGRESS NOTES
Pharmacy Med Education Group Note    Date: 1/25/23  Start Time: 1330  End Time: 4855    Number Participants in Group:  7    Goal:  Patient will demonstrate an understanding of the medications intended purpose and possible adverse effects  Topic: Hanna for Pharmacy Med Ed Group    Discipline Responsible:     OT  AT  [facility].  RT     X Other       Participation Level:     None  Minimal      X Active Listener    X Interactive    Monopolizing         Participation Quality:    X Appropriate  Inappropriate     X       Attentive        Intrusive          Sharing        Resistant          Supportive        Lethargic       Affective:     X Congruent  Incongruent  Blunted  Flat    Constricted  Anxious  Elated  Angry    Labile  Depressed  Other         Cognitive:    X Alert  Oriented PPTP     Concentration   X G  F  P   Attention Span   X G  F  P   Short-Term Memory   X G  F  P   Long-Term Memory  G  F  P   ProblemSolving/  Decision Making  G  F  P   Ability to Process  Information   X G  F  P      Contributing Factors             Delusional             Hallucinating             Flight of Ideas             Other:       Modes of Intervention:    X Education   X Support  Exploration    Clarifying  Problem Solving  Confrontation    Socialization  Limit Setting  Reality Testing    Activity  Movement  Media    Other:            Response to Learning:    X Able to verbalize current knowledge/experience    Able to verbalize/acknowledge new learning    Able to retain information    Capable of insight    Able to change behavior    Progressing to goal    Other:        Comments: Patient was focused on controlled substances.        Jelly Peralta, PharmD, BCPS, BCPP  1/25/2023 4:06 PM  [phone]

## 2023-01-25 NOTE — PROGRESS NOTES
Daily Progress Note  1/25/2023    Patient Name: Bishop Michael:  Paranoia and verbally aggressive behavior          SUBJECTIVE:      Patient is seen today for a follow up assessment. Patient has been compliant with scheduled medications at this time and has not required emergency medications in the past 24 hours. When approached for interview patient states she feels \"great\" and relates this to making a pact that she was going to leave her  until he called her and suggested marriage counseling and then everything changed and she felt much better. Patient presents with continued pressured speech however this has somewhat improved today. Patient states that she was irritable on unit and endorses insight into inappropriateness of these behaviors as evidenced by stating she apologized to staff on the unit. Patient is exhibiting some improvement in stability of symptoms however is not appropriate for a lower level of care at this time.       Appetite:  [x] Adequate/Unchanged  [] Increased  [] Decreased      Sleep:       [] Adequate/Unchanged  [x] Fair  [] Poor      Group Attendance on Unit:   [] Yes   [x] Selectively   []  No    Medication Side Effects: Denies         Mental Status Exam  Level of consciousness: Awake and alert  Appearance:  Appropriate attire, standing, fair grooming  Behavior/Motor: More approachable, engages with interviewer  Attitude toward examiner: Cooperative, less interruptive, improved eye contact  Speech: Pressured rate, volume and less irritable tone  Mood: \"great\"  Affect: Congruent, less agitated and frustrated  Thought processes: More linear and coherent  Thought content: Denies suicidal ideations              Denies homicidal ideations               Denies hallucinations              Denies delusions              Paranoia improving  Cognition:  Oriented to self, location, orientation to general circumstance improving  concentration: Fair  Memory: Impaired  Insight &Judgment: Poor though improving    Data   height is 5' 5\" (1.651 m) and weight is 132 lb 12.8 oz (60.2 kg). Her oral temperature is 98.2 °F (36.8 °C). Her blood pressure is 117/48 (abnormal) and her pulse is 117 (abnormal). Her respiration is 14. Labs:   Admission on 01/21/2023   Component Date Value Ref Range Status    Cholesterol 01/22/2023 196  <200 mg/dL Final    Comment:    Cholesterol Guidelines:      <200  Desirable   200-240  Borderline      >240  Undesirable         HDL 01/22/2023 52  >40 mg/dL Final    Comment:    HDL Guidelines:    <40     Undesirable   40-59    Borderline    >59     Desirable         LDL Cholesterol 01/22/2023 121  0 - 130 mg/dL Final    Comment:    LDL Guidelines:     <100    Desirable   100-129   Near to/above Desirable   130-159   Borderline      >159   Undesirable     Direct (measured) LDL and calculated LDL are not interchangeable tests. Chol/HDL Ratio 01/22/2023 3.8  <5 Final            Triglycerides 01/22/2023 115  <150 mg/dL Final    Comment:    Triglyceride Guidelines:     <150   Desirable   150-199  Borderline   200-499  High     >499   Very high   Based on AHA Guidelines for fasting triglyceride, October 2012. Hemoglobin A1C 01/22/2023 4.9  4.0 - 6.0 % Final    Estimated Avg Glucose 01/22/2023 94  mg/dL Final    Comment: The ADA and AACC recommend providing the estimated average glucose result to permit better   patient understanding of their HBA1c result. TSH 01/22/2023 0.86  0.30 - 5.00 uIU/mL Final         Reviewed patient's current plan of care and vital signs with nursing staff.     Labs reviewed: [x] Yes  Last EKG in EMR reviewed: [x] Yes  7/11/2022     Medications  Current Facility-Administered Medications: haloperidol (HALDOL) tablet 5 mg, 5 mg, Oral, Q6H PRN **AND** LORazepam (ATIVAN) tablet 2 mg, 2 mg, Oral, Q6H PRN  haloperidol lactate (HALDOL) injection 5 mg, 5 mg, IntraMUSCular, Q6H PRN **AND** LORazepam (ATIVAN) injection 2 mg, 2 mg, IntraMUSCular, Q6H PRN **AND** diphenhydrAMINE (BENADRYL) injection 50 mg, 50 mg, IntraMUSCular, Q6H PRN  nicotine polacrilex (NICORETTE) gum 2 mg, 2 mg, Oral, Q2H PRN  gabapentin (NEURONTIN) capsule 200 mg, 200 mg, Oral, TID  risperiDONE (RISPERDAL M-TABS) disintegrating tablet 1 mg, 1 mg, Oral, BID  ibuprofen (ADVIL;MOTRIN) tablet 400 mg, 400 mg, Oral, Q6H PRN  polyethylene glycol (GLYCOLAX) packet 17 g, 17 g, Oral, Daily PRN  aluminum & magnesium hydroxide-simethicone (MAALOX) 200-200-20 MG/5ML suspension 30 mL, 30 mL, Oral, Q6H PRN  hydrOXYzine HCl (ATARAX) tablet 50 mg, 50 mg, Oral, TID PRN  traZODone (DESYREL) tablet 50 mg, 50 mg, Oral, Nightly PRN  hydrocortisone 1 % cream, , Topical, BID    ASSESSMENT  Bipolar I disorder, most recent episode mixed, severe with psychotic features ([facility] [address]         PLAN  Patient symptoms are: Slowly improving  No new orders, continue current medication regimen and observe   Encourage oral medication compliance  Monitor need and frequency of PRN medications. Encourage oral intake  Encourage participation in groups and milieu. Attempt to develop insight. Psycho-education conducted. Probable discharge is likely in the next 2 to 3 days with continued stability in symptoms  Follow-up daily while inpatient. Patient continues to be monitored in the inpatient psychiatric facility at [facility] for safety and stabilization. Patient continues to need, on a daily basis, active treatment furnished directly by or requiring the supervision of inpatient psychiatric personnel. Electronically signed by MAURICE Huddleston CNP on 1/25/2023 at 5:45 PM    **This report has been created using voice recognition software. It may contain minor errors which are inherent in voice recognition technology. **

## 2023-01-25 NOTE — PROGRESS NOTES
01/24/23 1954   Encounter Summary   Encounter Overview/Reason  Spiritual/Emotional Needs   Service Provided For: Patient   Referral/Consult From: Sharmin   Last Encounter  01/24/23   Complexity of Encounter Low   Spiritual/Emotional needs   Type Spiritual Support   Rituals, Rites and Sacraments   Type Anointing  (Lequsharonda Caryl 1/24/23)

## 2023-01-25 NOTE — PLAN OF CARE
Problem: Depression  Goal: Will be euthymic at discharge  Description: INTERVENTIONS:  1. Administer medication as ordered  2. Provide emotional support via 1:1 interaction with staff  3. Encourage involvement in milieu/groups/activities  4. Monitor for social isolation  1/24/2023 2209 by James Rogers RN  Outcome: Progressing  Note: Patient denies thoughts of harm to self or others. Visual safety checks are completed every 15 minutes and randomly. 1/24/2023 1026 by Caridad Romero LPN  Outcome: Progressing     Problem: Genna  Goal: Will exhibit normal sleep and speech and no impulsivity  Description: INTERVENTIONS:  1. Administer medication as ordered  2. Set limits on impulsive behavior  3. Make attempts to decrease external stimuli as possible  1/24/2023 2209 by James Rogers RN  Outcome: Progressing  Note: Patient sleeping well thus far, this shift. 1/24/2023 1026 by Caridad Romero LPN  Outcome: Progressing     Problem: Anxiety  Goal: Will report anxiety at manageable levels  Description: INTERVENTIONS:  1. Administer medication as ordered  2. Teach and rehearse alternative coping skills  3.  Provide emotional support with 1:1 interaction with staff  1/24/2023 2209 by James Rogers RN  Outcome: Progressing  Flowsheets  Taken 1/24/2023 2145  Will report anxiety at manageable levels:   Administer medication as ordered   Provide emotional support with 1:1 interaction with staff   Teach and rehearse alternative coping skills  Taken 1/24/2023 2131  Will report anxiety at manageable levels:   Administer medication as ordered   Teach and rehearse alternative coping skills   Provide emotional support with 1:1 interaction with staff  1/24/2023 1026 by Caridad Romero LPN  Outcome: Progressing     Problem: Pain  Goal: Verbalizes/displays adequate comfort level or baseline comfort level  1/24/2023 2209 by James Rogers RN  Outcome: Progressing  Flowsheets (Taken 1/24/2023 2209)  Verbalizes/displays adequate comfort level or baseline comfort level: Encourage patient to monitor pain and request assistance  1/24/2023 1026 by Terry Osorio LPN  Outcome: Progressing

## 2023-01-25 NOTE — GROUP NOTE
Group Therapy Note    Date: 1/25/2023    Group Start Time: 1100  Group End Time: 1150  Group Topic: Music Therapy    [facility]EWEE [facility]    Ruben Anderson        Group Therapy Note    Attendees: 10/18     Patient's Goal:  Patients shared music, and analyzed lyrics for themes, offering general advice based on the themes they analyzed from music. Patients also engaged in brief reflection about catastrophizing. Patient goals to increase sense of self-confidence and motivation; Increase self-expression; Increase socialization; Engage in peer support; Normalization of the environment    Notes:  Patient attended and participated in group having positive interactions with peers and staff. Shared music and expressed her son liked the song, and she listens to it to remember him and the things they loved to do together. Asked patient if this is she thinks this a good tool in processing grief and honoring lost loved ones, and patient agreed    Status After Intervention:  Improved    Participation Level: Active Listener and Interactive    Participation Quality: Appropriate, Attentive, Sharing, and Supportive      Speech:  normal      Thought Process/Content: Logical  Linear      Affective Functioning: Congruent      Mood: euthymic      Level of consciousness:  Alert and Attentive      Response to Learning: Able to verbalize current knowledge/experience, Capable of insight, and Progressing to goal      Endings: None Reported    Modes of Intervention: Support, Socialization, Exploration, Activity, Media, and Reality-testing      Discipline Responsible: Psychoeducational Specialist      Signature:  Ruben Anderson

## 2023-01-25 NOTE — PLAN OF CARE
Problem: Depression  Goal: Will be euthymic at discharge  Description: INTERVENTIONS:  1. Administer medication as ordered  2. Provide emotional support via 1:1 interaction with staff  3. Encourage involvement in milieu/groups/activities  4. Monitor for social isolation  Outcome: Progressing  Patient is not yet able to verbalize a stable mood. Writer offered emotional support and encouraged patient to attend unit programming. Will continue to monitor and provide support as needed. Q15min checks for safety. Problem: Genna  Goal: Will exhibit normal sleep and speech and no impulsivity  Description: INTERVENTIONS:  1. Administer medication as ordered  2. Set limits on impulsive behavior  3. Make attempts to decrease external stimuli as possible  Outcome: Progressing  Patient exhibits normal sleep and speech with no impulsivity noted. Will continue to monitor and provide calming, therapeutic environment. Problem: Anxiety  Goal: Will report anxiety at manageable levels  Description: INTERVENTIONS:  1. Administer medication as ordered  2. Teach and rehearse alternative coping skills  3. Provide emotional support with 1:1 interaction with staff  Outcome: Progressing  Patient admits to feelings of anxiety and rates the severity 7/10. Writer offered talk time and educated patient on coping methods such as deep breathing, walking, meditation and calming activities. Will continue to provide patient with non pharmacological methods to reduce anxiety.

## 2023-01-26 VITALS
HEART RATE: 99 BPM | SYSTOLIC BLOOD PRESSURE: 99 MMHG | TEMPERATURE: 97.7 F | HEIGHT: 65 IN | BODY MASS INDEX: 22.13 KG/M2 | DIASTOLIC BLOOD PRESSURE: 71 MMHG | RESPIRATION RATE: 14 BRPM | WEIGHT: 132.8 LBS

## 2023-01-26 PROCEDURE — 6370000000 HC RX 637 (ALT 250 FOR IP)

## 2023-01-26 PROCEDURE — 6370000000 HC RX 637 (ALT 250 FOR IP): Performed by: PSYCHIATRY & NEUROLOGY

## 2023-01-26 PROCEDURE — 99239 HOSP IP/OBS DSCHRG MGMT >30: CPT

## 2023-01-26 RX ORDER — TRAZODONE HYDROCHLORIDE 50 MG/1
50 TABLET ORAL NIGHTLY PRN
Qty: 30 TABLET | Refills: 0 | Status: SHIPPED | OUTPATIENT
Start: 2023-01-26

## 2023-01-26 RX ORDER — RISPERIDONE 1 MG/1
1 TABLET, ORALLY DISINTEGRATING ORAL 2 TIMES DAILY
Qty: 60 TABLET | Refills: 0 | Status: SHIPPED | OUTPATIENT
Start: 2023-01-26

## 2023-01-26 RX ORDER — HYDROXYZINE 50 MG/1
50 TABLET, FILM COATED ORAL 3 TIMES DAILY PRN
Qty: 30 TABLET | Refills: 0 | Status: SHIPPED | OUTPATIENT
Start: 2023-01-26 | End: 2023-02-05

## 2023-01-26 RX ORDER — DIAPER,BRIEF,INFANT-TODD,DISP
EACH MISCELLANEOUS
Qty: 30 G | Refills: 0 | Status: SHIPPED | OUTPATIENT
Start: 2023-01-26 | End: 2023-02-02

## 2023-01-26 RX ORDER — GABAPENTIN 100 MG/1
200 CAPSULE ORAL 3 TIMES DAILY
Qty: 180 CAPSULE | Refills: 0 | Status: SHIPPED | OUTPATIENT
Start: 2023-01-26 | End: 2023-02-25

## 2023-01-26 RX ADMIN — GABAPENTIN 200 MG: 100 CAPSULE ORAL at 09:38

## 2023-01-26 RX ADMIN — RISPERIDONE 1 MG: 1 TABLET, ORALLY DISINTEGRATING ORAL at 09:38

## 2023-01-26 RX ADMIN — NICOTINE POLACRILEX 2 MG: 2 GUM, CHEWING BUCCAL at 09:42

## 2023-01-26 NOTE — PROGRESS NOTES
CLINICAL PHARMACY NOTE: MEDS TO BEDS    Total # of Prescriptions Filled: 5   The following medications were delivered to the patient:  Hydroxyzine HCL 50mg  Gabapentin 100mg  Trazodone HCL 50mg  Risperidone 1mg  Hydrocortisone 1% Cream OTC     Additional Documentation:  Delivered Medication to Nurse at Unit Door

## 2023-01-26 NOTE — GROUP NOTE
Group Therapy Note    Date: 1/26/2023    Group Start Time: 0900  Group End Time: 0930  Group Topic: Community Meeting    PEEWEE Gilbert        Group Therapy Note    Attendees: 14/18       Patient's Goal:  Listen and not talk over others    Notes:  controlled    Status After Intervention:  Unchanged    Participation Level: Active Listener    Participation Quality: Appropriate      Speech:  normal      Thought Process/Content: Logical      Affective Functioning: Congruent      Mood: anxious      Level of consciousness:  Oriented x4      Response to Learning: Able to verbalize current knowledge/experience and Progressing to goal      Endings: None Reported    Modes of Intervention: Education, Support, and Socialization      Discipline Responsible: Behavorial Health Tech      Signature:   Davin Gilbert

## 2023-01-26 NOTE — PLAN OF CARE
Problem: Depression  Goal: Will be euthymic at discharge  Description: INTERVENTIONS:  1. Administer medication as ordered  2. Provide emotional support via 1:1 interaction with staff  3. Encourage involvement in milieu/groups/activities  4. Monitor for social isolation  1/25/2023 2024 by Magdy Wolf  Outcome: Progressing     Problem: Anxiety  Goal: Will report anxiety at manageable levels  Description: INTERVENTIONS:  1. Administer medication as ordered  2. Teach and rehearse alternative coping skills  3.  Provide emotional support with 1:1 interaction with staff  1/25/2023 2024 by Magdy Wolf  Outcome: Progressing

## 2023-01-26 NOTE — BH NOTE
Patient given tobacco quitline number [phone] at this time. With nurse observation patient called number for information and follow up. Continue to reinforce the dangers of long term tobacco use and why tobacco cessation is important to patient.

## 2023-01-26 NOTE — PLAN OF CARE
Problem: Depression  Goal: Will be euthymic at discharge  Description: INTERVENTIONS:  1. Administer medication as ordered  2. Provide emotional support via 1:1 interaction with staff  3. Encourage involvement in milieu/groups/activities  4. Monitor for social isolation  Outcome: Completed     Problem: Genna  Goal: Will exhibit normal sleep and speech and no impulsivity  Description: INTERVENTIONS:  1. Administer medication as ordered  2. Set limits on impulsive behavior  3. Make attempts to decrease external stimuli as possible  Outcome: Completed     Problem: Anxiety  Goal: Will report anxiety at manageable levels  Description: INTERVENTIONS:  1. Administer medication as ordered  2. Teach and rehearse alternative coping skills  3.  Provide emotional support with 1:1 interaction with staff  Outcome: Completed     Problem: Pain  Goal: Verbalizes/displays adequate comfort level or baseline comfort level  Outcome: Completed

## 2023-01-26 NOTE — BH NOTE
[facility]  Discharge Note    Pt discharged with followings belongings:   Dental Appliances: None  Vision - Corrective Lenses: Eyeglasses, At bedside  Hearing Aid: None  Jewelry: Body Piercing, Earrings  Body Piercings Removed: No  Clothing:  (one glove)  Other Valuables: Money, Wallet, Keys, Lighter/Matches, Cigarettes   Valuables sent home with patient. Patient educated on aftercare instructions: Yes  Information faxed to Dr Fatmata Osei by Staff  at 12:58 PM .Patient verbalize understanding of AVS:  Yes. Status EXAM upon discharge:  Mental Status and Behavioral Exam  Normal: No  Level of Assistance: Independent/Self  Facial Expression: Brightened  Affect: Appropriate  Level of Consciousness: Alert  Frequency of Checks: 4 times per hour, close  Mood:Normal: No  Mood: Anxious  Motor Activity:Normal: Yes  Eye Contact: Good  Observed Behavior: Friendly, Cooperative  Sexual Misconduct History: Past - no  Preception: [place] to person, [place] to time, [place] to place, [place] to situation  Attention:Normal: Yes  Attention: Hyperalert  Thought Processes: Circumstantial  Thought Content:Normal: Yes  Thought Content: Preoccupations  Depression Symptoms: Increased irritability  Anxiety Symptoms: Generalized  Genna Symptoms: No problems reported or observed.   Hallucinations: None  Delusions: No  Memory:Normal: Yes  Memory: Poor recent  Insight and Judgment: No  Insight and Judgment: Poor insight        Metabolic Screening:    Lab Results   Component Value Date    LABA1C 4.9 01/22/2023       Lab Results   Component Value Date    CHOL 196 01/22/2023    CHOL 156 12/07/2015    CHOL 171 07/28/2014    CHOL 175 11/21/2013    CHOL 125 11/24/2012    CHOL 182 08/17/2012     Lab Results   Component Value Date    TRIG 115 01/22/2023    TRIG 128 12/07/2015    TRIG 115 07/28/2014    TRIG 156 (H) 11/21/2013    TRIG 85 11/24/2012    TRIG 251 (H) 08/17/2012     Lab Results   Component Value Date    HDL 52 01/22/2023    HDL 42 12/07/2015    HDL 60 07/28/2014    HDL 61 11/21/2013    HDL 59 11/24/2012    HDL 59 08/17/2012     No components found for: LDLCAL  No results found for: LABVLDL  Patient stable upon discharge picked up by  to go home.    Pelon Barroso LPN

## 2023-01-26 NOTE — GROUP NOTE
Group Therapy Note    Date: 1/25/2023    Group Start Time: 2030  Group End Time: 2100  Group Topic: Wrap-Up    PEEWEE George        Group Therapy Note    Attendees: 13/18         Status After Intervention:  Improved    Participation Level:  Active Listener    Participation Quality: Appropriate      Speech:  normal      Thought Process/Content: Logical      Affective Functioning: Congruent      Mood: elevated      Level of consciousness:  Alert      Response to Learning: Able to verbalize current knowledge/experience      Endings: None Reported    Modes of Intervention: Socialization      Discipline Responsible: Behavorial Health Tech      Signature:  Radha Randolph

## 2023-01-26 NOTE — DISCHARGE INSTRUCTIONS
Information:  Medications:   Medication summary provided   I understand that I should take only the medications on my list.     -why and when I need to take each medicine.     -which side effects to watch for.     -that I should carry my medication information at all times in case of     Emergency situations. I will take all of my medicines to follow up appointments.     -check with my physician or pharmacist before taking any new    Medication, over the counter product or drink alcohol.    -Ask about food, drug or dietary supplement interactions.    -discard old lists and update records with medication providers. Notify Physician:  Notify physician if you notice:   Always call 911 if you feel your life is in danger  In case of an emergency call 911 immediately! If 911 is not available call your local emergency medical system for help    Behavioral Health Follow Up:  Original Referral Source:[facility]  Discharge Diagnosis: Acute psychosis ([facility] [address] [F23]  Recommendations for Level of Care: Attend all follow up appointments and take medication as prescribed. Patient status at discharge: Stable  My hospital  was: [facility]  Aftercare plan faxed: Yes   -faxed by: Staff   -date: 1/26/2023   -time: 1:00 pm   Prescriptions: [facility]     Smoking: Quit Smoking. Call the [employer]'s smoking quitline at 6-052-41K-QUIT  Know the signs of a heart attack   If you have any of the following symptoms call 911 immediately, do not wait more    Than five minutes. 1. Pressure, fullness and/ or squeezing in the center of the chest spreading to    The jaw, neck or shoulder. 2. Chest discomfort with light headedness, fainting, sweating, nausea or    Shortness of breath. 3. Upper abdominal pressure or discomfort. 4. Lower chest pain, back pain, unusual fatigue, shortness of breath, nausea   Or dizziness.      General Information:   Questions regarding your bill: Call HELP program [phone]     Suicide Hotline (Rescue Crisis) (998) 249-8522     Recovery Help line- 962.922.2235      To obtain results of pending studies call Medical Records at: [phone]     For emergencies and 24 hour/7 days a week contact information:  [phone]        Learning About COVID-19 and Flu Symptoms  How can you tell COVID-19 from the flu? COVID-19 and the flu have similar symptoms. The two can be hard to tell apart. The only way to know for sure which illness you have is to be tested. If you have questions about COVID-19 testing, ask your doctor or go to cdc.gov to use the COVID-19 Viral Testing Tool. Since the symptoms are so alike, it makes sense to act as if you have COVID-19 until your test results come back. This means staying home and limiting contact with people in your home. You'll need to wash your hands often and disinfect surfaces that you touch. And be sure to wear a mask when you're around other people. This is also good advice if you think you have the flu. COVID-19 and the flu have these symptoms in common:  Fever or chills  Cough  Shortness of breath  Fatigue (tiredness)  Sore throat  Runny or stuffy nose  Muscle and body aches  Headache  Vomiting and diarrhea (more common in children than adults)  COVID-19 has another symptom that also may occur:  New loss of taste or smell  COVID-19 symptoms may appear from 2 to 14 days after infection. Flu symptoms usually appear 1 to 4 days after infection. Why should you get the flu and COVID-19 vaccines? It's important to get your yearly flu vaccine and stay up to date on your COVID-19 vaccines. The flu and COVID-19 can be active at the same time. You can get sick with both infections at once. And having both may make you more sick than getting just one. Getting both vaccines can prevent you and others from getting very sick. If you do get the flu or COVID-19 after being vaccinated, you're much less likely to get seriously ill. Where can you learn more?   Go to http://www.[employer]/ and enter C123 to learn more about \"Learning About COVID-19 and Flu Symptoms. \"  Current as of: October 28, 2022               Content Version: 13.5  © 3413-0026 Healthwise, Incorporated. Care instructions adapted under license by [facility] [facility] If you have questions about a medical condition or this instruction, always ask your healthcare professional. [place] [address] any warranty or liability for your use of this information.
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
## 2023-01-26 NOTE — GROUP NOTE
Group Therapy Note    Date: 1/25/2023    Group Start Time: 0900  Group End Time: 0915  Group Topic: Community Meeting    PEEWEE MITCHELL    Vani Freitas      Group Therapy Note    Attendees: 10/20       Patient's Goal: Patient will verbalize today's goals. Patient will also offer supportive listening and interact with peers to clarify and                                 understand clearly set goals. Notes: Patient is making progress AEB participating in group discussion, actively listening, and supporting other group members. Status After Intervention: Improved      Participation Level:  Active Listener and Interactive      Participation Quality: Appropriate, Attentive, Sharing, and Supportive      Speech: normal      Thought Process/Content: Logical, Linear      Affective Functioning: Congruent      Mood: anxious      Level of consciousness: Oriented x4      Response to Learning: Able to verbalize current knowledge/experience, Able to verbalize/acknowledge new learning,      Able to retain information, and Capable of insight      Endings: None Reported      Modes of Intervention: Education, Support, Socialization, and Problem-solving        Discipline Responsible: Behavorial Health Tech      Signature: Vani Freitas

## 2023-01-26 NOTE — CARE COORDINATION
Name: Page Merida    : 1965    Discharge Date: 23    Primary Auth/Cert #: [id number]    Destination: home     Discharge Medications:      Medication List        START taking these medications      hydrocortisone 1 % cream  Apply topically 2 times daily. Notes to patient: Corticosteroid     hydrOXYzine HCl 50 MG tablet  Commonly known as: ATARAX  Take 1 tablet by mouth 3 times daily as needed for Anxiety  Notes to patient: Anxiety      risperiDONE 1 MG disintegrating tablet  Commonly known as: RISPERDAL M-TABS  Take 1 tablet by mouth 2 times daily  Notes to patient: Clear thoughts      traZODone 50 MG tablet  Commonly known as: DESYREL  Take 1 tablet by mouth nightly as needed for Sleep  Notes to patient: Insomnia            CHANGE how you take these medications      gabapentin 100 MG capsule  Commonly known as: NEURONTIN  Take 2 capsules by mouth 3 times daily for 30 days.   What changed:   medication strength  how much to take  Notes to patient: Neuropathy             STOP taking these medications      amphetamine-dextroamphetamine 20 MG tablet  Commonly known as: ADDERALL     DayVigo 10 MG Tabs  Generic drug: Lemborexant     loratadine 10 MG tablet  Commonly known as: CLARITIN     QUEtiapine 25 MG tablet  Commonly known as: SEROQUEL     QUEtiapine 50 MG extended release tablet  Commonly known as: SEROQUEL XR     zaleplon 5 MG capsule  Commonly known as: SONATA               Where to Get Your Medications        These medications were sent to [facility] [address] [address]      Phone: [phone]   gabapentin 100 MG capsule  hydrocortisone 1 % cream  hydrOXYzine HCl 50 MG tablet  risperiDONE 1 MG disintegrating tablet  traZODone 50 MG tablet         Follow Up Appointment: Malathi Chavez MD  [address], [address] [place] [address]  Follow up on 2023  Pt has appointment @ 2:30 pm

## 2023-05-04 ENCOUNTER — HOSPITAL ENCOUNTER (OUTPATIENT)
Age: 58
Setting detail: SPECIMEN
Discharge: HOME | End: 2023-05-04

## 2023-05-05 ENCOUNTER — HOSPITAL ENCOUNTER (OUTPATIENT)
Dept: MAMMOGRAPHY | Age: 58
End: 2023-05-05
Payer: COMMERCIAL

## 2023-05-05 DIAGNOSIS — Z12.31 ENCOUNTER FOR SCREENING MAMMOGRAM FOR BREAST CANCER: ICD-10-CM

## 2023-05-05 PROCEDURE — 77063 BREAST TOMOSYNTHESIS BI: CPT

## 2023-05-09 LAB
HPV SAMPLE: ABNORMAL
HPV, GENOTYPE 16: NOT DETECTED
HPV, GENOTYPE 18: NOT DETECTED
HPV, HIGH RISK OTHER: DETECTED
HPV, INTERPRETATION: ABNORMAL
SPECIMEN DESCRIPTION: ABNORMAL

## 2023-05-12 LAB — CYTOLOGY REPORT: NORMAL

## 2023-09-12 ENCOUNTER — OFFICE VISIT (OUTPATIENT)
Age: 58
End: 2023-09-12
Payer: COMMERCIAL

## 2023-09-12 VITALS
BODY MASS INDEX: 20.35 KG/M2 | SYSTOLIC BLOOD PRESSURE: 122 MMHG | WEIGHT: 119.2 LBS | HEIGHT: 64 IN | DIASTOLIC BLOOD PRESSURE: 63 MMHG | RESPIRATION RATE: 12 BRPM | HEART RATE: 78 BPM

## 2023-09-12 DIAGNOSIS — Z11.4 SCREENING FOR HIV (HUMAN IMMUNODEFICIENCY VIRUS): ICD-10-CM

## 2023-09-12 DIAGNOSIS — Z76.89 ENCOUNTER TO ESTABLISH CARE WITH NEW DOCTOR: ICD-10-CM

## 2023-09-12 DIAGNOSIS — F41.9 ANXIETY: ICD-10-CM

## 2023-09-12 DIAGNOSIS — F31.9 BIPOLAR 1 DISORDER (HCC): ICD-10-CM

## 2023-09-12 DIAGNOSIS — Z91.89 ENCOUNTER FOR HCV SCREENING TEST FOR HIGH RISK PATIENT: ICD-10-CM

## 2023-09-12 DIAGNOSIS — F19.11 HISTORY OF SUBSTANCE ABUSE (HCC): ICD-10-CM

## 2023-09-12 DIAGNOSIS — Z69.81 PATIENT COUNSELED AS VICTIM OF DOMESTIC VIOLENCE: ICD-10-CM

## 2023-09-12 DIAGNOSIS — Z11.59 ENCOUNTER FOR HCV SCREENING TEST FOR HIGH RISK PATIENT: ICD-10-CM

## 2023-09-12 DIAGNOSIS — Z72.0 TOBACCO USE: ICD-10-CM

## 2023-09-12 DIAGNOSIS — G47.00 INSOMNIA, UNSPECIFIED TYPE: Primary | ICD-10-CM

## 2023-09-12 PROCEDURE — 96127 BRIEF EMOTIONAL/BEHAV ASSMT: CPT | Performed by: STUDENT IN AN ORGANIZED HEALTH CARE EDUCATION/TRAINING PROGRAM

## 2023-09-12 PROCEDURE — 99204 OFFICE O/P NEW MOD 45 MIN: CPT | Performed by: STUDENT IN AN ORGANIZED HEALTH CARE EDUCATION/TRAINING PROGRAM

## 2023-09-12 PROCEDURE — 96127 BRIEF EMOTIONAL/BEHAV ASSMT: CPT | Performed by: FAMILY MEDICINE

## 2023-09-12 RX ORDER — CETIRIZINE HYDROCHLORIDE 10 MG/1
10 TABLET ORAL DAILY
COMMUNITY

## 2023-09-12 RX ORDER — HYDROXYZINE HYDROCHLORIDE 25 MG/1
25 TABLET, FILM COATED ORAL 2 TIMES DAILY PRN
Qty: 60 TABLET | Refills: 1 | Status: SHIPPED | OUTPATIENT
Start: 2023-09-12 | End: 2023-10-12

## 2023-09-12 RX ORDER — QUETIAPINE FUMARATE 25 MG/1
25 TABLET, FILM COATED ORAL NIGHTLY
Qty: 30 TABLET | Refills: 1 | Status: SHIPPED | OUTPATIENT
Start: 2023-09-12 | End: 2023-10-12

## 2023-09-12 SDOH — ECONOMIC STABILITY: FOOD INSECURITY: WITHIN THE PAST 12 MONTHS, YOU WORRIED THAT YOUR FOOD WOULD RUN OUT BEFORE YOU GOT MONEY TO BUY MORE.: NEVER TRUE

## 2023-09-12 SDOH — ECONOMIC STABILITY: HOUSING INSECURITY
IN THE LAST 12 MONTHS, WAS THERE A TIME WHEN YOU DID NOT HAVE A STEADY PLACE TO SLEEP OR SLEPT IN A SHELTER (INCLUDING NOW)?: NO

## 2023-09-12 SDOH — ECONOMIC STABILITY: FOOD INSECURITY: WITHIN THE PAST 12 MONTHS, THE FOOD YOU BOUGHT JUST DIDN'T LAST AND YOU DIDN'T HAVE MONEY TO GET MORE.: NEVER TRUE

## 2023-09-12 SDOH — ECONOMIC STABILITY: INCOME INSECURITY: HOW HARD IS IT FOR YOU TO PAY FOR THE VERY BASICS LIKE FOOD, HOUSING, MEDICAL CARE, AND HEATING?: VERY HARD

## 2023-09-12 ASSESSMENT — PATIENT HEALTH QUESTIONNAIRE - PHQ9
3. TROUBLE FALLING OR STAYING ASLEEP: 3
8. MOVING OR SPEAKING SO SLOWLY THAT OTHER PEOPLE COULD HAVE NOTICED. OR THE OPPOSITE, BEING SO FIGETY OR RESTLESS THAT YOU HAVE BEEN MOVING AROUND A LOT MORE THAN USUAL: 0
SUM OF ALL RESPONSES TO PHQ QUESTIONS 1-9: 3
2. FEELING DOWN, DEPRESSED OR HOPELESS: 0
SUM OF ALL RESPONSES TO PHQ QUESTIONS 1-9: 3
SUM OF ALL RESPONSES TO PHQ9 QUESTIONS 1 & 2: 0
9. THOUGHTS THAT YOU WOULD BE BETTER OFF DEAD, OR OF HURTING YOURSELF: 0
1. LITTLE INTEREST OR PLEASURE IN DOING THINGS: 0
4. FEELING TIRED OR HAVING LITTLE ENERGY: 0
5. POOR APPETITE OR OVEREATING: 0
7. TROUBLE CONCENTRATING ON THINGS, SUCH AS READING THE NEWSPAPER OR WATCHING TELEVISION: 0
6. FEELING BAD ABOUT YOURSELF - OR THAT YOU ARE A FAILURE OR HAVE LET YOURSELF OR YOUR FAMILY DOWN: 0
10. IF YOU CHECKED OFF ANY PROBLEMS, HOW DIFFICULT HAVE THESE PROBLEMS MADE IT FOR YOU TO DO YOUR WORK, TAKE CARE OF THINGS AT HOME, OR GET ALONG WITH OTHER PEOPLE: 0
SUM OF ALL RESPONSES TO PHQ QUESTIONS 1-9: 3
SUM OF ALL RESPONSES TO PHQ QUESTIONS 1-9: 3

## 2023-09-12 ASSESSMENT — ANXIETY QUESTIONNAIRES
GAD7 TOTAL SCORE: 4
IF YOU CHECKED OFF ANY PROBLEMS ON THIS QUESTIONNAIRE, HOW DIFFICULT HAVE THESE PROBLEMS MADE IT FOR YOU TO DO YOUR WORK, TAKE CARE OF THINGS AT HOME, OR GET ALONG WITH OTHER PEOPLE: NOT DIFFICULT AT ALL
3. WORRYING TOO MUCH ABOUT DIFFERENT THINGS: 1
1. FEELING NERVOUS, ANXIOUS, OR ON EDGE: 1
6. BECOMING EASILY ANNOYED OR IRRITABLE: 0
2. NOT BEING ABLE TO STOP OR CONTROL WORRYING: 1
5. BEING SO RESTLESS THAT IT IS HARD TO SIT STILL: 1
7. FEELING AFRAID AS IF SOMETHING AWFUL MIGHT HAPPEN: 0
4. TROUBLE RELAXING: 0

## 2023-09-12 ASSESSMENT — ENCOUNTER SYMPTOMS
GASTROINTESTINAL NEGATIVE: 1
SHORTNESS OF BREATH: 0
VOMITING: 0
NAUSEA: 0
RESPIRATORY NEGATIVE: 1
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
## 2023-09-12 NOTE — PATIENT INSTRUCTIONS
[facility] Service Commission:  What they offer:  Electric and gas payment services for veterans  Phone Number: (959) 192-2054 [address]  Website: http://co.ge. oh.  Pathway:  What they offer: [address]   Phone Number: (517) 758-2135 ext: Alli Izquierdoel  Website: www.pathwaytoledo. org  [employer] NOW:  What they offer: Heat, Electric, Gas and water payment services. Phone Number: (123) 489-8892 Service-Intake  Website: BuilksalvHappyBoxynwSpotRight0 [facility] [address] Action:  What they offer: Electric, gas and [employer]. Phone: 578.715.4323    Website: http://saintmartindeporres. com/#!/home  [facility]FS:  What they offer: Electric, gas and water payment service. Phone Number: 929.997.6543 Option 7  Website: [employer]/    MEDICATIONS  Good Rx: What they offer: Good Rx tracks prescription drug prices and provides free drug coupons for discounts on medications. Website: VipAnalysis.is. com  NeedyMeds:  What they offer: NeedyMeds offers free information on medications and healthcare cost savings programs including prescription assistance programs, coupons, and discount programs. Helpline: 861.852.3259  Website: PaymentBack.JackBe. org  RX Assist:  What they offer: Information about free and low-cost medicine programs. Website: https://www.baltazar-teagan.org/  [employer] $4 Prescription Program:  What they offer: Prescription Program includes up to a 30-day supply for $4 and a 90-day supply for $10 of some covered generic drugs at commonly prescribed dosages  Website: Mesh Systems.de    Need additional resources? Call 211   Find Help https://www. findPicksPalp.orgTRANSPORTATION RESOURCES    [employer]:  What they offer: Medical Appointments Transportation  Phone Number: (713)

## 2023-09-15 ENCOUNTER — HOSPITAL ENCOUNTER (OUTPATIENT)
Age: 58
Setting detail: SPECIMEN
Discharge: HOME | End: 2023-09-15

## 2023-09-15 DIAGNOSIS — Z11.4 SCREENING FOR HIV (HUMAN IMMUNODEFICIENCY VIRUS): ICD-10-CM

## 2023-09-15 DIAGNOSIS — Z11.59 ENCOUNTER FOR HCV SCREENING TEST FOR HIGH RISK PATIENT: ICD-10-CM

## 2023-09-15 DIAGNOSIS — Z91.89 ENCOUNTER FOR HCV SCREENING TEST FOR HIGH RISK PATIENT: ICD-10-CM

## 2023-09-15 DIAGNOSIS — Z76.89 ENCOUNTER TO ESTABLISH CARE WITH NEW DOCTOR: ICD-10-CM

## 2023-09-15 LAB — HCV AB SERPL QL IA: NONREACTIVE

## 2023-09-16 LAB — HIV 1+2 AB+HIV1 P24 AG SERPL QL IA: NONREACTIVE

## 2023-10-03 ENCOUNTER — TELEPHONE (OUTPATIENT)
Age: 58
End: 2023-10-03

## 2023-10-03 NOTE — TELEPHONE ENCOUNTER
Called Ms. Claudia Madrid in regard to message I received from [facility] stating that patient would be more appropriate for possible referral to [facility] or [address] [place] [address]:    \"Regarding psychiatrist services. This patient needs a higher level of care than what we provide. With her standing diagnosis of Bipolar I Disorder with psychotic episodes, I recommend that she be referred to [facility] or [address] [place] [address] for combined medication management along with community based case management. \"    Patient states that she is now following with psychiatrist Dr. Uriel Edmond and is currently doing well. Does not have any questions or concerns at this time.

## 2023-10-10 ENCOUNTER — TELEPHONE (OUTPATIENT)
Age: 58
End: 2023-10-10

## 2023-10-10 DIAGNOSIS — Z13.9 SCREENING DUE: Primary | ICD-10-CM

## 2023-10-10 NOTE — TELEPHONE ENCOUNTER
Patient would like to know if she can get an order for her cologuard testing.  Also she was trying to get a RSV injection from [facility] and her insurance stated that she needs a prior authorization, which she is going to have [facility] initiate it and have them fax it to our office

## 2023-10-11 ENCOUNTER — TELEPHONE (OUTPATIENT)
Age: 58
End: 2023-10-11

## 2023-10-11 DIAGNOSIS — K21.9 GASTROESOPHAGEAL REFLUX DISEASE, UNSPECIFIED WHETHER ESOPHAGITIS PRESENT: Primary | ICD-10-CM

## 2023-10-11 RX ORDER — OMEPRAZOLE 20 MG/1
20 CAPSULE, DELAYED RELEASE ORAL
Qty: 30 CAPSULE | Refills: 2 | Status: SHIPPED | OUTPATIENT
Start: 2023-10-11

## 2023-10-11 NOTE — TELEPHONE ENCOUNTER
Pt is calling because she is wanting to know if prilosec can be sent to the pharmacy.  She has been paying cash for it but the cost is to high

## 2023-10-28 LAB — NONINV COLON CA DNA+OCC BLD SCRN STL QL: NEGATIVE

## 2023-12-18 PROBLEM — R87.810 CERVICAL HIGH RISK HUMAN PAPILLOMAVIRUS (HPV) DNA TEST POSITIVE: Status: ACTIVE | Noted: 2023-12-18

## 2023-12-18 PROBLEM — Z87.891 PERSONAL HISTORY OF TOBACCO USE: Status: ACTIVE | Noted: 2023-12-18

## 2024-01-05 DIAGNOSIS — K21.9 GASTROESOPHAGEAL REFLUX DISEASE, UNSPECIFIED WHETHER ESOPHAGITIS PRESENT: ICD-10-CM

## 2024-01-05 RX ORDER — OMEPRAZOLE 20 MG/1
20 CAPSULE, DELAYED RELEASE ORAL
Qty: 30 CAPSULE | Refills: 2 | Status: SHIPPED | OUTPATIENT
Start: 2024-01-05

## 2024-03-26 DIAGNOSIS — K21.9 GASTROESOPHAGEAL REFLUX DISEASE, UNSPECIFIED WHETHER ESOPHAGITIS PRESENT: ICD-10-CM

## 2024-03-26 RX ORDER — OMEPRAZOLE 20 MG/1
20 CAPSULE, DELAYED RELEASE ORAL
Qty: 30 CAPSULE | Refills: 2 | Status: SHIPPED | OUTPATIENT
Start: 2024-03-26

## 2024-06-07 ENCOUNTER — HOSPITAL ENCOUNTER (OUTPATIENT)
Dept: MAMMOGRAPHY | Age: 59
End: 2024-06-07
Payer: COMMERCIAL

## 2024-06-07 DIAGNOSIS — Z12.31 ENCOUNTER FOR SCREENING MAMMOGRAM FOR MALIGNANT NEOPLASM OF BREAST: ICD-10-CM

## 2024-06-07 PROCEDURE — 77063 BREAST TOMOSYNTHESIS BI: CPT

## 2024-06-23 DIAGNOSIS — K21.9 GASTROESOPHAGEAL REFLUX DISEASE, UNSPECIFIED WHETHER ESOPHAGITIS PRESENT: ICD-10-CM

## 2024-06-24 RX ORDER — OMEPRAZOLE 20 MG/1
20 CAPSULE, DELAYED RELEASE ORAL
Qty: 30 CAPSULE | Refills: 2 | Status: SHIPPED | OUTPATIENT
Start: 2024-06-24

## 2024-07-03 ENCOUNTER — APPOINTMENT (OUTPATIENT)
Dept: GENERAL RADIOLOGY | Age: 59
End: 2024-07-03
Payer: COMMERCIAL

## 2024-07-03 ENCOUNTER — HOSPITAL ENCOUNTER (EMERGENCY)
Age: 59
Discharge: LEFT BEFORE DISCHARGE | End: 2024-07-03
Payer: COMMERCIAL

## 2024-07-03 VITALS
SYSTOLIC BLOOD PRESSURE: 118 MMHG | DIASTOLIC BLOOD PRESSURE: 97 MMHG | HEIGHT: 63 IN | TEMPERATURE: 97.6 F | OXYGEN SATURATION: 99 % | RESPIRATION RATE: 18 BRPM | HEART RATE: 88 BPM | WEIGHT: 131 LBS | BODY MASS INDEX: 23.21 KG/M2

## 2024-07-03 DIAGNOSIS — R07.9 CHEST PAIN, UNSPECIFIED TYPE: Primary | ICD-10-CM

## 2024-07-03 LAB
ANION GAP SERPL CALCULATED.3IONS-SCNC: 11 MMOL/L (ref 9–17)
BASOPHILS # BLD: <0.03 K/UL (ref 0–0.2)
BASOPHILS NFR BLD: 0 % (ref 0–2)
BUN SERPL-MCNC: 6 MG/DL (ref 8–23)
BUN/CREAT SERPL: 9 (ref 9–20)
CALCIUM SERPL-MCNC: 9.2 MG/DL (ref 8.6–10.4)
CHLORIDE SERPL-SCNC: 104 MMOL/L (ref 98–107)
CO2 SERPL-SCNC: 24 MMOL/L (ref 20–31)
CREAT SERPL-MCNC: 0.7 MG/DL (ref 0.5–0.9)
EKG ATRIAL RATE: 97 BPM
EKG P AXIS: 77 DEGREES
EKG P-R INTERVAL: 116 MS
EKG Q-T INTERVAL: 366 MS
EKG QRS DURATION: 82 MS
EKG QTC CALCULATION (BAZETT): 464 MS
EKG R AXIS: 79 DEGREES
EKG T AXIS: 66 DEGREES
EKG VENTRICULAR RATE: 97 BPM
EOSINOPHIL # BLD: 0.08 K/UL (ref 0–0.44)
EOSINOPHILS RELATIVE PERCENT: 1 % (ref 1–4)
ERYTHROCYTE [DISTWIDTH] IN BLOOD BY AUTOMATED COUNT: 13.2 % (ref 11.8–14.4)
GFR, ESTIMATED: >90 ML/MIN/1.73M2
GLUCOSE SERPL-MCNC: 129 MG/DL (ref 70–99)
HCT VFR BLD AUTO: 40 % (ref 36.3–47.1)
HGB BLD-MCNC: 13.9 G/DL (ref 11.9–15.1)
IMM GRANULOCYTES # BLD AUTO: <0.03 K/UL (ref 0–0.3)
IMM GRANULOCYTES NFR BLD: 0 %
LYMPHOCYTES NFR BLD: 2.16 K/UL (ref 1.1–3.7)
LYMPHOCYTES RELATIVE PERCENT: 27 % (ref 24–43)
MCH RBC QN AUTO: 31 PG (ref 25.2–33.5)
MCHC RBC AUTO-ENTMCNC: 34.8 G/DL (ref 28.4–34.8)
MCV RBC AUTO: 89.3 FL (ref 82.6–102.9)
MONOCYTES NFR BLD: 0.58 K/UL (ref 0.1–1.2)
MONOCYTES NFR BLD: 7 % (ref 3–12)
NEUTROPHILS NFR BLD: 65 % (ref 36–65)
NEUTS SEG NFR BLD: 5.01 K/UL (ref 1.5–8.1)
NRBC BLD-RTO: 0 PER 100 WBC
PLATELET # BLD AUTO: 356 K/UL (ref 138–453)
PMV BLD AUTO: 10.7 FL (ref 8.1–13.5)
POTASSIUM SERPL-SCNC: 3.4 MMOL/L (ref 3.7–5.3)
RBC # BLD AUTO: 4.48 M/UL (ref 3.95–5.11)
SODIUM SERPL-SCNC: 139 MMOL/L (ref 135–144)
TROPONIN I SERPL HS-MCNC: 6 NG/L (ref 0–14)
WBC OTHER # BLD: 7.9 K/UL (ref 3.5–11.3)

## 2024-07-03 PROCEDURE — 84484 ASSAY OF TROPONIN QUANT: CPT

## 2024-07-03 PROCEDURE — 99284 EMERGENCY DEPT VISIT MOD MDM: CPT

## 2024-07-03 PROCEDURE — 93010 ELECTROCARDIOGRAM REPORT: CPT | Performed by: FAMILY MEDICINE

## 2024-07-03 PROCEDURE — 80048 BASIC METABOLIC PNL TOTAL CA: CPT

## 2024-07-03 PROCEDURE — 93005 ELECTROCARDIOGRAM TRACING: CPT | Performed by: STUDENT IN AN ORGANIZED HEALTH CARE EDUCATION/TRAINING PROGRAM

## 2024-07-03 PROCEDURE — 85025 COMPLETE CBC W/AUTO DIFF WBC: CPT

## 2024-07-03 PROCEDURE — 71045 X-RAY EXAM CHEST 1 VIEW: CPT

## 2024-07-03 RX ORDER — POTASSIUM CHLORIDE 20 MEQ/1
40 TABLET, EXTENDED RELEASE ORAL ONCE
Status: DISCONTINUED | OUTPATIENT
Start: 2024-07-03 | End: 2024-07-03 | Stop reason: HOSPADM

## 2024-07-03 ASSESSMENT — HEART SCORE
ECG: NON-SPECIFC REPOLARIZATION DISTURBANCE/LBTB/PM
ECG: NON-SPECIFC REPOLARIZATION DISTURBANCE/LBTB/PM

## 2024-07-03 ASSESSMENT — ENCOUNTER SYMPTOMS: CHEST TIGHTNESS: 1

## 2024-07-03 NOTE — ED PROVIDER NOTES
[facility] ED  EMERGENCY DEPARTMENT ENCOUNTER      Pt Name: Raven Fitzgerald  MRN: [id number]  Birthdate 1965  Date of evaluation: 7/3/2024  Provider: JAVID Elaine, MAURICE - CNP  7:31 PM    CHIEF COMPLAINT       Chief Complaint   Patient presents with    Chest Pain     Chest pain began this afternoon around 1645 with tightness of the chest and tingling, patient was sitting and smoking and began to feel pain.          HISTORY OF PRESENT ILLNESS        Raven Fitzgerald 58 yo female presents from home to ED with c/o chest pain. Chest pain began this afternoon around 1645 with tightness of the chest and tingling, patient was sitting and smoking and began to feel pain. Pt reports it was sharp pain with tightness. Pt denies change in medications or previous hx of same.     PMH: allergic rhinitis, anxiety, arthritis, bipolar disorder, depression, gerd, impaired fasting glucose,pneumonia, substance abuse, tobacco dependence.    Positive family hx MI mother.       The history is provided by the patient. No  was used.       Nursing Notes were reviewed.    REVIEW OF SYSTEMS       Review of Systems   Respiratory:  Positive for chest tightness.    Cardiovascular:  Positive for chest pain.   All other systems reviewed and are negative.      Except as noted above the remainder of the review of systems was reviewed and negative.       PAST MEDICAL HISTORY     Past Medical History:   Diagnosis Date    Allergic rhinitis     Anxiety     Arthritis     Bipolar disorder (HCC) 01/2023    Self-reported    Depression     GERD (gastroesophageal reflux disease)     H/O psychiatric care     Impaired fasting glucose     Ligament tear     Rt shoulder [facility] injections    Osteoarthritis     Pneumonia     Pure hyperglyceridemia     Substance abuse (HCC)     [facility] Drug Dependency Clean 09/19/2012    Tobacco use          SURGICAL HISTORY       Past Surgical History:   Procedure Laterality Date

## 2024-10-25 DIAGNOSIS — K21.9 GASTROESOPHAGEAL REFLUX DISEASE, UNSPECIFIED WHETHER ESOPHAGITIS PRESENT: ICD-10-CM

## (undated) DEVICE — SUTURE VCRL SZ 2-0 L27IN ABSRB UD L26MM SH 1/2 CIR J417H

## (undated) DEVICE — NEEDLE HYPO 25GA L1.5IN BLU POLYPR HUB S STL REG BVL STR

## (undated) DEVICE — SUTURE VCRL SZ 4-0 L27IN ABSRB UD L26MM SH 1/2 CIR J415H

## (undated) DEVICE — STOCKINETTE ORTH W9XL36IN COT 2 PLY HLLW FOR HANDLING LMB

## (undated) DEVICE — DRAPE C ARM CLP FOR GE 9600 9800

## (undated) DEVICE — THIN OFFSET (9.0 X 0.38 X 25.0MM)

## (undated) DEVICE — SUTURE MCRYL SZ 4-0 L27IN ABSRB UD L19MM PS-2 1/2 CIR PRIM Y426H

## (undated) DEVICE — K WIRE FIX L150MM DIA1.25MM S STL TRCR PNT
Type: IMPLANTABLE DEVICE | Site: FOOT | Status: NON-FUNCTIONAL
Removed: 2022-07-28

## (undated) DEVICE — TUBING, SUCTION, 9/32" X 12', STRAIGHT: Brand: MEDLINE INDUSTRIES, INC.

## (undated) DEVICE — BANDAGE COMPR W4INXL9FT EXSANG SGL LAYERED NO CLSR ESMARCH

## (undated) DEVICE — BLADE SURG NO15 S STL STR DISP GLASSVAN

## (undated) DEVICE — SUTURE VCRL + SZ 3-0 L27IN ABSRB UD L26MM SH 1/2 CIR VCP416H

## (undated) DEVICE — HYPODERMIC SAFETY NEEDLE: Brand: MAGELLAN

## (undated) DEVICE — BANDAGE,GAUZE,BULKEE II,4.5"X4.1YD,STRL: Brand: MEDLINE

## (undated) DEVICE — SPONGE GZ W4XL4IN COT 12 PLY TYP VII WVN C FLD DSGN

## (undated) DEVICE — PRECISION THIN (7.0 X 0.38 X 29.5MM)

## (undated) DEVICE — Device

## (undated) DEVICE — SHEET,DRAPE,53X77,STERILE: Brand: MEDLINE

## (undated) DEVICE — GLOVE SURG SZ 75 CRM LTX FREE POLYISOPRENE POLYMER BEAD ANTI

## (undated) DEVICE — WIRE FIX L150MM DIA1.6MM THRD L15MM FOREFOOT/MIDFOOT COMPR

## (undated) DEVICE — DISCONTINUED NO SUB IDED TG GLOVE SURG SENSICARE ALOE LT LF PF ST GRN SZ 8

## (undated) DEVICE — ZIMMER® STERILE DISPOSABLE TOURNIQUET CUFF WITH PLC, SINGLE PORT, SINGLE BLADDER, 12 IN. (30 CM)

## (undated) DEVICE — KIT ARMOR C DRP COLLAPSIBLE AND SELF EXP TOP CVR FOR FLUOROSCOPIC

## (undated) DEVICE — UNDERGLOVE SURG SZ 8 BLU LTX FREE SYN POLYISOPRENE POLYMER

## (undated) DEVICE — GAUZE,SPONGE,FLUFF,6"X6.75",STRL,5/TRAY: Brand: MEDLINE

## (undated) DEVICE — YANKAUER,BULB TIP,W/O VENT,RIGID,STERILE: Brand: MEDLINE

## (undated) DEVICE — HAND AND FT PK

## (undated) DEVICE — GAUZE,SPONGE,FLUFF,4"X4",12PLY,STRL,2'S: Brand: MEDLINE

## (undated) DEVICE — WIRE FIX L150MM DIA1.6MM THRD L20MM TI PARTIALLY THRDED FOR

## (undated) DEVICE — ASTOUND STANDARD SURGICAL GOWN, XL: Brand: CONVERTORS

## (undated) DEVICE — BANDAGE COBAN 4 IN COMPR W4INXL5YD FOAM COHESIVE QUIK STK SELF ADH SFT

## (undated) DEVICE — BIT DRL L140MM DIA2MM QUIK CPL 3 FLUT CALIB DEPTH MRK W/O

## (undated) DEVICE — STRIP,CLOSURE,WOUND,MEDI-STRIP,1/2X4: Brand: MEDLINE

## (undated) DEVICE — SUTURE VCRL + SZ 4-0 L27IN ABSRB UD PS-2 3/8 CIR REV CUT VCP426H

## (undated) DEVICE — IMPLANTABLE DEVICE
Type: IMPLANTABLE DEVICE | Site: FOOT | Status: NON-FUNCTIONAL
Removed: 2022-07-28

## (undated) DEVICE — SUTURE ETHLN SZ 3-0 L18IN NONABSORBABLE BLK L24MM PS-1 3/8 1663G